# Patient Record
Sex: FEMALE | Race: BLACK OR AFRICAN AMERICAN | Employment: FULL TIME | URBAN - NONMETROPOLITAN AREA
[De-identification: names, ages, dates, MRNs, and addresses within clinical notes are randomized per-mention and may not be internally consistent; named-entity substitution may affect disease eponyms.]

---

## 2017-07-03 ENCOUNTER — OFFICE VISIT (OUTPATIENT)
Dept: ENDOCRINOLOGY | Age: 42
End: 2017-07-03

## 2017-07-03 VITALS
DIASTOLIC BLOOD PRESSURE: 80 MMHG | RESPIRATION RATE: 18 BRPM | HEIGHT: 64 IN | BODY MASS INDEX: 50.02 KG/M2 | HEART RATE: 88 BPM | WEIGHT: 293 LBS | SYSTOLIC BLOOD PRESSURE: 130 MMHG

## 2017-07-03 DIAGNOSIS — E89.0 POSTOPERATIVE HYPOTHYROIDISM: Primary | ICD-10-CM

## 2017-07-03 DIAGNOSIS — I10 ESSENTIAL HYPERTENSION: ICD-10-CM

## 2017-07-03 DIAGNOSIS — E55.9 HYPOVITAMINOSIS D: ICD-10-CM

## 2017-07-03 PROCEDURE — 99214 OFFICE O/P EST MOD 30 MIN: CPT | Performed by: INTERNAL MEDICINE

## 2017-07-03 RX ORDER — LEVOTHYROXINE SODIUM 175 MCG
TABLET ORAL
Qty: 90 TABLET | Refills: 1 | Status: SHIPPED | OUTPATIENT
Start: 2017-07-03 | End: 2018-01-05 | Stop reason: SDUPTHER

## 2017-07-05 ENCOUNTER — TELEPHONE (OUTPATIENT)
Dept: ENDOCRINOLOGY | Age: 42
End: 2017-07-05

## 2017-07-05 DIAGNOSIS — E89.0 POSTOPERATIVE HYPOTHYROIDISM: ICD-10-CM

## 2017-07-05 DIAGNOSIS — E55.9 HYPOVITAMINOSIS D: Primary | ICD-10-CM

## 2018-01-02 ENCOUNTER — NURSE TRIAGE (OUTPATIENT)
Dept: ADMINISTRATIVE | Age: 43
End: 2018-01-02

## 2018-01-05 ENCOUNTER — OFFICE VISIT (OUTPATIENT)
Dept: ENDOCRINOLOGY | Age: 43
End: 2018-01-05
Payer: COMMERCIAL

## 2018-01-05 VITALS
HEIGHT: 64 IN | DIASTOLIC BLOOD PRESSURE: 66 MMHG | HEART RATE: 99 BPM | RESPIRATION RATE: 14 BRPM | SYSTOLIC BLOOD PRESSURE: 124 MMHG | WEIGHT: 289 LBS | BODY MASS INDEX: 49.34 KG/M2

## 2018-01-05 DIAGNOSIS — E55.9 HYPOVITAMINOSIS D: ICD-10-CM

## 2018-01-05 DIAGNOSIS — I10 ESSENTIAL HYPERTENSION: ICD-10-CM

## 2018-01-05 DIAGNOSIS — E89.0 POSTOPERATIVE HYPOTHYROIDISM: Primary | ICD-10-CM

## 2018-01-05 PROCEDURE — 99213 OFFICE O/P EST LOW 20 MIN: CPT | Performed by: INTERNAL MEDICINE

## 2018-01-05 RX ORDER — LEVOTHYROXINE SODIUM 175 MCG
TABLET ORAL
Qty: 90 TABLET | Refills: 1 | Status: SHIPPED | OUTPATIENT
Start: 2018-01-05 | End: 2018-08-31 | Stop reason: SDUPTHER

## 2018-01-05 RX ORDER — LEVOTHYROXINE SODIUM 175 MCG
TABLET ORAL
Qty: 90 TABLET | Refills: 1 | Status: SHIPPED | OUTPATIENT
Start: 2018-01-05 | End: 2018-01-05 | Stop reason: SDUPTHER

## 2018-01-05 NOTE — PROGRESS NOTES
systems were reviewed and negative. Objective:   /66   Pulse 99   Resp 14   Ht 5' 4.02\" (1.626 m)   Wt 289 lb (131.1 kg)   BMI 49.58 kg/m²     General:  alert, appears stated age, cooperative and no distress   Oropharynx: normal findings: lips normal without lesions, buccal mucosa normal, gums healthy and tongue midline and normal    Eyes:  negative findings: lids and lashes normal, conjunctivae and sclerae normal, corneas clear and pupils equal, round, reactive to light and accomodation       Neck: no adenopathy, no carotid bruit, no JVD and supple, symmetrical, trachea midline   Thyroid:  Status post thyroidectomy. Lung: clear to auscultation bilaterally   Heart:  regular rate and rhythm, S1, S2 normal, no murmur, click, rub or gallop and normal apical impulse   Abdomen: soft, non-tender; bowel sounds normal; no masses,  no organomegaly   Extremities: extremities normal, atraumatic, no cyanosis or edema and Homans sign is negative, no sign of DVT   Pulses: 2+ and symmetric   Skin: warm and dry, no hyperpigmentation, vitiligo, or suspicious lesions   Neuro: normal without focal findings, mental status, speech normal, alert and oriented x3, DEMETRA, cranial nerves 2-12 intact, muscle tone and strength normal and symmetric. Assessment/Plan:     1. Postoperative hypothyroidism: Clinically doing well. We do need to monitor levels. 2. Essential hypertension: on treatment with reasonable control. To continue. 3. Hypovitaminosis D: On vitamin D replacement and monitor levels.        Orders Placed This Encounter   Procedures    TSH without Reflex     Standing Status:   Future     Standing Expiration Date:   1/5/2019    T4, Free     Standing Status:   Future     Standing Expiration Date:   1/5/2019    Vitamin D 25 Hydroxy     Standing Status:   Future     Standing Expiration Date:   1/5/2019    TSH without Reflex     Standing Status:   Future     Standing Expiration Date:   1/5/2019    T4, Free     Standing Status:   Future     Standing Expiration Date:   1/5/2019       · The risks and benefits of my recommendations, as well as other treatment options were discussed with the patient today. Questions were answered. · Follow up: 6 months and as needed. Electronically signed by Salty Dozier MD on 1/5/18 at 11:07 AM    **This report has been created using voice recognition software. It may   contain minor errors which are inherent in voice recognition technology. **

## 2018-01-08 ENCOUNTER — HOSPITAL ENCOUNTER (OUTPATIENT)
Age: 43
Discharge: HOME | End: 2018-01-08
Payer: COMMERCIAL

## 2018-01-08 DIAGNOSIS — E55.9 HYPOVITAMINOSIS D: ICD-10-CM

## 2018-01-08 DIAGNOSIS — E89.0 POSTOPERATIVE HYPOTHYROIDISM: ICD-10-CM

## 2018-01-08 LAB
AVERAGE GLUCOSE: 102 MG/DL (ref 70–126)
CA 125: 7 U/ML
HBA1C MFR BLD: 5.4 % (ref 4.4–6.4)
HCT VFR BLD CALC: 34.2 % (ref 37–47)
HEMOGLOBIN: 11.6 GM/DL (ref 12–16)
MCH RBC QN AUTO: 31.4 PG (ref 27–31)
MCHC RBC AUTO-ENTMCNC: 33.8 GM/DL (ref 33–37)
MCV RBC AUTO: 92.9 FL (ref 81–99)
PDW BLD-RTO: 15.2 % (ref 11.5–14.5)
PLATELET # BLD: 214 THOU/MM3 (ref 130–400)
PMV BLD AUTO: 10.2 MCM (ref 7.4–10.4)
RBC # BLD: 3.68 MILL/MM3 (ref 4.2–5.4)
T4 FREE: 1.5 NG/DL (ref 0.93–1.76)
TSH SERPL DL<=0.05 MIU/L-ACNC: 2.24 UIU/ML (ref 0.4–4.2)
VITAMIN D 25-HYDROXY: 35 NG/ML (ref 30–100)
WBC # BLD: 4.8 THOU/MM3 (ref 4.8–10.8)

## 2018-01-08 PROCEDURE — 36415 COLL VENOUS BLD VENIPUNCTURE: CPT

## 2018-01-08 PROCEDURE — 84439 ASSAY OF FREE THYROXINE: CPT

## 2018-01-08 PROCEDURE — 84443 ASSAY THYROID STIM HORMONE: CPT

## 2018-01-08 PROCEDURE — 82306 VITAMIN D 25 HYDROXY: CPT

## 2018-01-08 PROCEDURE — 83036 HEMOGLOBIN GLYCOSYLATED A1C: CPT

## 2018-01-08 PROCEDURE — 85027 COMPLETE CBC AUTOMATED: CPT

## 2018-01-08 PROCEDURE — 86304 IMMUNOASSAY TUMOR CA 125: CPT

## 2018-07-03 ENCOUNTER — NURSE TRIAGE (OUTPATIENT)
Dept: ADMINISTRATIVE | Age: 43
End: 2018-07-03

## 2018-08-02 ENCOUNTER — HOSPITAL ENCOUNTER (OUTPATIENT)
Dept: MRI IMAGING | Age: 43
Discharge: HOME | End: 2018-08-02
Payer: COMMERCIAL

## 2018-08-02 DIAGNOSIS — M25.561 ACUTE PAIN OF RIGHT KNEE: ICD-10-CM

## 2018-08-02 DIAGNOSIS — M25.361 INSTABILITY OF RIGHT KNEE JOINT: ICD-10-CM

## 2018-08-02 PROCEDURE — 73721 MRI JNT OF LWR EXTRE W/O DYE: CPT

## 2018-08-16 ENCOUNTER — APPOINTMENT (OUTPATIENT)
Dept: PHYSICAL THERAPY | Age: 43
End: 2018-08-16
Payer: COMMERCIAL

## 2018-08-22 ENCOUNTER — HOSPITAL ENCOUNTER (OUTPATIENT)
Dept: PHYSICAL THERAPY | Age: 43
Setting detail: THERAPIES SERIES
Discharge: HOME | End: 2018-08-22
Payer: COMMERCIAL

## 2018-08-23 ENCOUNTER — HOSPITAL ENCOUNTER (OUTPATIENT)
Dept: PHYSICAL THERAPY | Age: 43
Setting detail: THERAPIES SERIES
Discharge: HOME | End: 2018-08-23
Payer: COMMERCIAL

## 2018-08-23 PROCEDURE — 97162 PT EVAL MOD COMPLEX 30 MIN: CPT

## 2018-08-23 PROCEDURE — 97110 THERAPEUTIC EXERCISES: CPT

## 2018-08-23 ASSESSMENT — PAIN DESCRIPTION - LOCATION: LOCATION: LEG;KNEE;HIP

## 2018-08-23 ASSESSMENT — PAIN SCALES - GENERAL: PAINLEVEL_OUTOF10: 7

## 2018-08-23 ASSESSMENT — PAIN DESCRIPTION - PAIN TYPE: TYPE: CHRONIC PAIN

## 2018-08-23 ASSESSMENT — PAIN DESCRIPTION - ORIENTATION: ORIENTATION: RIGHT
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
## 2018-08-28 ENCOUNTER — HOSPITAL ENCOUNTER (OUTPATIENT)
Dept: PHYSICAL THERAPY | Age: 43
Setting detail: THERAPIES SERIES
Discharge: HOME | End: 2018-08-28
Payer: COMMERCIAL

## 2018-08-28 PROCEDURE — 97110 THERAPEUTIC EXERCISES: CPT

## 2018-08-28 PROCEDURE — G0283 ELEC STIM OTHER THAN WOUND: HCPCS

## 2018-08-28 ASSESSMENT — PAIN SCALES - GENERAL: PAINLEVEL_OUTOF10: 8

## 2018-08-28 ASSESSMENT — PAIN DESCRIPTION - PAIN TYPE: TYPE: CHRONIC PAIN

## 2018-08-28 ASSESSMENT — PAIN DESCRIPTION - ORIENTATION: ORIENTATION: RIGHT

## 2018-08-28 ASSESSMENT — PAIN DESCRIPTION - LOCATION: LOCATION: LEG;KNEE;HIP

## 2018-08-28 NOTE — PROGRESS NOTES
flexion for ease with dressing  Short term goal 2: Patient to ambulate with more normal gait pattern with smooth swing through, good heel strike and no compensation to do all shopping  Short term goal 3: Patient to be able to use reciprocal pattern and minimal compensation to go up and down stairs to get into the home  Short term goal 4: Patient to perform all balance activity with one hand assist for safety with community ambulation  Short term goal 5: Patient to start pool therapy without increased pain to progress to land therapy. Long term goals  Time Frame for Long term goals : 8 weeks  Long term goal 1: Patient to be independent with home program to perform all daily activity with minimal to no right knee pain.          [address], [address] [place] [address]

## 2018-08-31 ENCOUNTER — HOSPITAL ENCOUNTER (OUTPATIENT)
Dept: PHYSICAL THERAPY | Age: 43
Setting detail: THERAPIES SERIES
Discharge: HOME | End: 2018-08-31
Payer: COMMERCIAL

## 2018-08-31 DIAGNOSIS — E89.0 POSTOPERATIVE HYPOTHYROIDISM: ICD-10-CM

## 2018-08-31 PROCEDURE — 97113 AQUATIC THERAPY/EXERCISES: CPT

## 2018-08-31 ASSESSMENT — PAIN SCALES - GENERAL: PAINLEVEL_OUTOF10: 5

## 2018-08-31 NOTE — PROGRESS NOTES
minutes with noodle  Hip Flex/Ext: x10 with noodle  Hip ABD/ADD: x10 with noodle         Activity Tolerance:  Activity Tolerance: Patient Tolerated treatment well, Patient limited by pain    Assessment: Body structures, Functions, Activity limitations: Decreased functional mobility , Decreased ADL status, Decreased ROM, Decreased strength, Decreased endurance, Decreased balance  Assessment: Patient tolerated pool session fairly well. No complaints of increased pain throughout session. Patient did have some mild discomfort and instability in the knee with step ups. No change in pain at end of session. Prognosis: Fair  Discharge Recommendations: Continue to assess pending progress    Patient Education:  Patient Education: Continue HEP. Stop with any production of pain. Monitor pain after pool session    Plan:  Times per week: 2-3x/week depending on patient's schedule and what doctor orders  Plan weeks: 8 weeks  Specific instructions for Next Treatment: pool therapy, gait and balance training, knee ROM and stretches at steps, Nu-step, modalities or manual therapy, taping. Current Treatment Recommendations: Strengthening, ROM, Balance Training, Functional Mobility Training, Endurance Training, Stair training, Gait Training, Neuromuscular Re-education, Manual Therapy - Soft Tissue Mobilization, Home Exercise Program, Modalities, Aquatics  Plan Comment: Continue with POC    Goals:  Patient goals :  To return to all activity without right knee and thigh pain    Short term goals  Time Frame for Short term goals: 4 weeks  Short term goal 1: Patient to demonstrate full right knee extension and 120 degrees flexion for ease with dressing  Short term goal 2: Patient to ambulate with more normal gait pattern with smooth swing through, good heel strike and no compensation to do all shopping  Short term goal 3: Patient to be able to use reciprocal pattern and minimal compensation to go up and down stairs to get into the

## 2018-09-04 ENCOUNTER — HOSPITAL ENCOUNTER (OUTPATIENT)
Dept: PHYSICAL THERAPY | Age: 43
Setting detail: THERAPIES SERIES
Discharge: HOME | End: 2018-09-04
Payer: COMMERCIAL

## 2018-09-04 ENCOUNTER — TELEPHONE (OUTPATIENT)
Dept: INTERNAL MEDICINE CLINIC | Age: 43
End: 2018-09-04

## 2018-09-04 PROCEDURE — 97113 AQUATIC THERAPY/EXERCISES: CPT

## 2018-09-04 RX ORDER — LEVOTHYROXINE SODIUM 175 MCG
TABLET ORAL
Qty: 90 TABLET | Refills: 1 | Status: SHIPPED | OUTPATIENT
Start: 2018-09-04 | End: 2019-05-10 | Stop reason: SDUPTHER

## 2018-09-04 ASSESSMENT — PAIN SCALES - GENERAL: PAINLEVEL_OUTOF10: 6

## 2018-09-04 ASSESSMENT — PAIN DESCRIPTION - PAIN TYPE: TYPE: CHRONIC PAIN

## 2018-09-04 NOTE — PROGRESS NOTES
[place]     Time In: 7038  Time Out: 3238  Minutes: 50  Timed Code Treatment Minutes: 50 Minutes     Date: 2018  Patient Name: Yvonne Campbell,  Gender:  female        CSN: [id number]   : 1975  (43 y.o.)       Referring Practitioner: Dr Robe Krueger      Diagnosis: Primary OA of right knee, Quadriceps weakness, Right thigh pain  Treatment Diagnosis: right knee and leg pain, right leg weakness, balance deficits, difficulty with ambuation   Additional Pertinent Hx: No restrictions from doctor. Hx: High BP, Irregular heartbeat                  General:  PT Visit Information  Onset Date: 18  PT Insurance Information: [facility] Medicaid - Allowed 30 visits PT per calendar year. Aquatics and modalities except HP/CP covered. Total # of Visits to Date: 4  Plan of Care/Certification Expiration Date: 10/18/18  Progress Note Counter: 4/10               Subjective:  Chart Reviewed: Yes  Patient assessed for rehabilitation services?: Yes  Family / Caregiver Present: No  Comments: Follow up with doctor in 4 weeks. States doctor said to hold on bracing until next doctor appt. Subjective: Knee felt really good following last appointment; felt like my knee was responding as it should. Patient ended up walking most of the way here.      Pain:  Patient Currently in Pain: Yes  Pain Assessment: 0-10  Pain Level: 6  Pain Type: Chronic pain      Objective                  LE Warm Up: Ambulation (F,L,R): x2 laps each direction           Static Strengthening LEs  Heel/Toe Raises: x10 each  Marching: with noodle-emphasis on biking motion  Hamstring Curls: x10 bilateral with noodle  4-Way Hip: (3-way) x15 bilateral   Dynamic Strengthening LE  Step-Ups (F,L,R): Forward x10 bilateral-left foot on step  Dynamic Strengthening Other: partial squat position (chair pose) with UE horizontal abduction x10; squat with forward punches with more normal gait pattern with smooth swing through, good heel strike and no compensation to do all shopping  Short term goal 3: Patient to be able to use reciprocal pattern and minimal compensation to go up and down stairs to get into the home  Short term goal 4: Patient to perform all balance activity with one hand assist for safety with community ambulation  Short term goal 5: Patient to start pool therapy without increased pain to progress to land therapy. Long term goals  Time Frame for Long term goals : 8 weeks  Long term goal 1: Patient to be independent with home program to perform all daily activity with minimal to no right knee pain.          Doug Schmidt, PT

## 2018-09-05 ENCOUNTER — HOSPITAL ENCOUNTER (OUTPATIENT)
Dept: PHYSICAL THERAPY | Age: 43
Setting detail: THERAPIES SERIES
Discharge: HOME | End: 2018-09-05
Payer: COMMERCIAL

## 2018-09-05 PROCEDURE — 97113 AQUATIC THERAPY/EXERCISES: CPT

## 2018-09-05 ASSESSMENT — PAIN DESCRIPTION - LOCATION: LOCATION: KNEE

## 2018-09-05 ASSESSMENT — PAIN SCALES - GENERAL: PAINLEVEL_OUTOF10: 4

## 2018-09-05 ASSESSMENT — PAIN DESCRIPTION - ORIENTATION: ORIENTATION: RIGHT

## 2018-09-05 NOTE — PROGRESS NOTES
[place]     Time In: 1100  Time Out: 8243  Minutes: 45  Timed Code Treatment Minutes: 45 Minutes     Date: 2018  Patient Name: David Swenson,  Gender:  female        CSN: [id number]   : 1975  (43 y.o.)       Referring Practitioner: Dr Kirt Hickman      Diagnosis: Primary OA of right knee, Quadriceps weakness, Right thigh pain  Treatment Diagnosis: right knee and leg pain, right leg weakness, balance deficits, difficulty with ambuation   Additional Pertinent Hx: No restrictions from doctor. Hx: High BP, Irregular heartbeat                  General:  PT Visit Information  Onset Date: 18  PT Insurance Information: [employer] - Allowed 30 visits PT per calendar year. Aquatics and modalities except HP/CP covered. Total # of Visits Approved: 30  Total # of Visits to Date: 5  Plan of Care/Certification Expiration Date: 10/18/18  Progress Note Counter: 5/10               Subjective:  Chart Reviewed: Yes  Patient assessed for rehabilitation services?: Yes  Family / Caregiver Present: No  Comments: Follow up with doctor in 4 weeks. States doctor said to hold on bracing until next doctor appt. Subjective: Pt stated R knee is stiff today. Pt has no had any falls or R knee has not given out. Pain control with ibuprofen and aleve.       Pain:  Patient Currently in Pain: Yes  Pain Level: 4  Pain Location: Knee  Pain Orientation: Right      Objective                                                                                    LE Warm Up: Ambulation (F,L,R): x2 laps each direction  LE Streches: Right hamstring and calf 3x 15 seconds at step           Static Strengthening LEs  Heel/Toe Raises: x15 each  Squats: x15  Marching: X15 ea  Hamstring Curls: x15 ea  4-Way Hip: (3-way) x15 bilateral   Dynamic Strengthening LE  Step-Ups (F,L,R): Forward and lateral x10 bilateral lead  Dynamic Strengthening Other: partial squat position (chair pose) with UE horizontal abduction x10; squat with forward punches with yellow resistance paddles (level 3)- 10 punches   Deep H2O LE  Bike: x2 minutes with noodle (began session with bike)  Hang: for 5 min. Hip Flex/Ext: x10 with noodle  Hip ABD/ADD: x10 with noodle                             Activity Tolerance:  Activity Tolerance: Patient Tolerated treatment well    Assessment: Body structures, Functions, Activity limitations: Decreased functional mobility , Decreased ADL status, Decreased ROM, Decreased strength, Decreased endurance, Decreased balance  Assessment: Added reps this date with good tolerance. Educated pt in abdominal bracing with activity to assist with posture. Continue to strengthen R knee to return to functional mobility. Prognosis: Fair  Discharge Recommendations: Continue to assess pending progress    Patient Education:  Patient Education: abdominal bracing, posture, stretches for R knee                      Plan:  Times per week: 2-3x/week depending on patient's schedule and what doctor orders  Plan weeks: 8 weeks  Specific instructions for Next Treatment: pool therapy, gait and balance training, knee ROM and stretches at steps, Nu-step, modalities or manual therapy, taping. Current Treatment Recommendations: Strengthening, ROM, Balance Training, Functional Mobility Training, Endurance Training, Stair training, Gait Training, Neuromuscular Re-education, Manual Therapy - Soft Tissue Mobilization, Home Exercise Program, Modalities, Aquatics  Plan Comment: Continue with POC; start with the deep water cycling before other exercises     Goals:  Patient goals :  To return to all activity without right knee and thigh pain    Short term goals  Time Frame for Short term goals: 4 weeks  Short term goal 1: Patient to demonstrate full right knee extension and 120 degrees flexion for ease with dressing  Short term goal 2: Patient to ambulate with more normal gait

## 2018-09-07 ENCOUNTER — NURSE TRIAGE (OUTPATIENT)
Dept: ADMINISTRATIVE | Age: 43
End: 2018-09-07

## 2018-09-07 ENCOUNTER — APPOINTMENT (OUTPATIENT)
Dept: PHYSICAL THERAPY | Age: 43
End: 2018-09-07
Payer: COMMERCIAL

## 2018-09-07 NOTE — TELEPHONE ENCOUNTER
prescription or OTC? \" (e.g., birth control pills, estrogen)      none  9. BLOOD THINNERS: \"Do you take any blood thinners? \" (e.g., Coumadin/warfarin, Pradaxa/dabigatran, aspirin)      no  10. CAUSE: \"What do you think is causing the bleeding? \" (e.g., recent gyn surgery, recent gyn procedure; known bleeding disorder, cervical cancer, polycystic ovarian disease, fibroids)          Not sure  11. HEMODYNAMIC STATUS: \"Are you weak or feeling lightheaded? \" If so, ask: \"Can you stand and walk normally? \"   None, feeling fine, saw Dr Ryan Rosado in Jan or Feb 2018 PAP normal          12. OTHER SYMPTOMS: \"What other symptoms are you having with the bleeding? \" (e.g., passed tissue, vaginal discharge, fever, menstrual-type cramps)        none    Protocols used: VAGINAL BLEEDING - ABNORMAL-ADULT-

## 2018-09-10 ENCOUNTER — APPOINTMENT (OUTPATIENT)
Dept: PHYSICAL THERAPY | Age: 43
End: 2018-09-10
Payer: COMMERCIAL

## 2018-09-11 ENCOUNTER — HOSPITAL ENCOUNTER (OUTPATIENT)
Dept: PHYSICAL THERAPY | Age: 43
Setting detail: THERAPIES SERIES
Discharge: HOME | End: 2018-09-11
Payer: COMMERCIAL

## 2018-09-11 PROCEDURE — 97113 AQUATIC THERAPY/EXERCISES: CPT

## 2018-09-11 ASSESSMENT — PAIN SCALES - GENERAL: PAINLEVEL_OUTOF10: 4

## 2018-09-11 ASSESSMENT — PAIN DESCRIPTION - PAIN TYPE: TYPE: CHRONIC PAIN

## 2018-09-11 ASSESSMENT — PAIN DESCRIPTION - LOCATION: LOCATION: KNEE

## 2018-09-11 NOTE — PROGRESS NOTES
[place]     Time In: 1400  Time Out: 7353  Minutes: 45  Timed Code Treatment Minutes: 45 Minutes     Date: 2018  Patient Name: Judith Martin,  Gender:  female        CSN: [id number]   : 1975  (43 y.o.)       Referring Practitioner: Dr Simmons Police      Diagnosis: Primary OA of right knee, Quadriceps weakness, Right thigh pain  Treatment Diagnosis: right knee and leg pain, right leg weakness, balance deficits, difficulty with ambuation   Additional Pertinent Hx: No restrictions from doctor. Hx: High BP, Irregular heartbeat                  General:  PT Visit Information  Onset Date: 18  PT Insurance Information: [employer] - Allowed 30 visits PT per calendar year. Aquatics and modalities except HP/CP covered. Total # of Visits Approved: 30  Total # of Visits to Date: 6  Plan of Care/Certification Expiration Date: 10/18/18  Progress Note Counter: 6/10               Subjective:  Chart Reviewed: Yes  Patient assessed for rehabilitation services?: Yes  Response To Previous Treatment: Patient with no complaints from previous session. Family / Caregiver Present: No     Subjective: Patient reports 4/10 today. Had increase in pain on  during her mom's wedding.      Pain:  Patient Currently in Pain: Yes  Pain Assessment: 0-10  Pain Level: 4  Pain Type: Chronic pain  Pain Location: Knee      Objective                   LE Warm Up: Ambulation (F,L,R): x2 laps each direction  LE Streches: Right hamstring and calf 3x 15 seconds at step           Static Strengthening LEs  Heel/Toe Raises: x15 each  Squats: x15  Marching: scissors/cross country skiiers 30x  Hamstring Curls: x15 ea  4-Way Hip: (3-way) x15 bilateral   Dynamic Strengthening LE  Step-Ups (F,L,R): Forward and lateral x10 bilateral lead  Deep H2O LE  Bike: x2 minutes 2 separate times-at the beginning and again at the end  Jan: for 5

## 2018-09-12 ENCOUNTER — HOSPITAL ENCOUNTER (OUTPATIENT)
Dept: PHYSICAL THERAPY | Age: 43
Setting detail: THERAPIES SERIES
Discharge: HOME | End: 2018-09-12
Payer: COMMERCIAL

## 2018-09-12 PROCEDURE — 97113 AQUATIC THERAPY/EXERCISES: CPT

## 2018-09-12 ASSESSMENT — PAIN DESCRIPTION - PAIN TYPE: TYPE: CHRONIC PAIN

## 2018-09-12 ASSESSMENT — PAIN DESCRIPTION - ORIENTATION: ORIENTATION: RIGHT

## 2018-09-12 ASSESSMENT — PAIN DESCRIPTION - LOCATION: LOCATION: KNEE

## 2018-09-12 ASSESSMENT — PAIN SCALES - GENERAL: PAINLEVEL_OUTOF10: 4

## 2018-09-14 ENCOUNTER — HOSPITAL ENCOUNTER (OUTPATIENT)
Dept: PHYSICAL THERAPY | Age: 43
Setting detail: THERAPIES SERIES
Discharge: HOME | End: 2018-09-14
Payer: COMMERCIAL

## 2018-09-14 PROCEDURE — 97110 THERAPEUTIC EXERCISES: CPT

## 2018-09-14 ASSESSMENT — PAIN DESCRIPTION - ORIENTATION: ORIENTATION: RIGHT

## 2018-09-14 ASSESSMENT — PAIN SCALES - GENERAL: PAINLEVEL_OUTOF10: 5

## 2018-09-14 ASSESSMENT — PAIN DESCRIPTION - PAIN TYPE: TYPE: CHRONIC PAIN

## 2018-09-14 ASSESSMENT — PAIN DESCRIPTION - LOCATION: LOCATION: KNEE

## 2018-09-14 NOTE — PROGRESS NOTES
limited knee mobility and occasional circumduction but is starting to move a little more smoothly as knee gets stronger  Short term goal 3: Patient to be able to use reciprocal pattern and minimal compensation to go up and down stairs to get into the home - NOT MET Patient still using non-reciprocal pattern for the stairs. Short term goal 4: Patient to perform all balance activity with one hand assist for safety with community ambulation - NOT MET Patient just getting ready to start balance activity on land. Short term goal 5: Patient to start pool therapy without increased pain to progress to land therapy. - MET    Long term goals  Time Frame for Long term goals : 8 weeks  Long term goal 1: Patient to be independent with home program to perform all daily activity with minimal to no right knee pain. - NOT MET Patient still working towards independence. Revised Short-Term Goals:    Short term goals  Time Frame for Short term goals: 4 weeks  Short term goal 1: See LTG  Short term goal 2:    Short term goal 3:    Short term goal 4:    Short term goal 5:      Revised Long-Term Goals  Long term goals  Time Frame for Long term goals : 4 weeks  Long term goal 1: Patient to be independent with home program to perform all daily activity with minimal to no right knee pain.   Long term goal 2: Patient to demonstrate full right knee extension and 120 degrees flexion for ease with dressing  Long term goal 3: Patient to ambulate with more normal gait pattern with smooth swing through, good heel strike and no compensation to do all shopping  Long term goal 4: Patient to be able to use reciprocal pattern and minimal compensation to go up and down stairs to get into the home  Long term goal 5: Patient to perform all balance activity with one hand assist for safety with community ambulation         [address], [address] [place] [address]

## 2018-09-18 ENCOUNTER — HOSPITAL ENCOUNTER (OUTPATIENT)
Dept: PHYSICAL THERAPY | Age: 43
Setting detail: THERAPIES SERIES
Discharge: HOME | End: 2018-09-18
Payer: COMMERCIAL

## 2018-09-18 PROCEDURE — 97113 AQUATIC THERAPY/EXERCISES: CPT

## 2018-09-18 ASSESSMENT — PAIN SCALES - GENERAL: PAINLEVEL_OUTOF10: 3

## 2018-09-18 NOTE — PROGRESS NOTES
closed yellow paddles x15 punches   Deep H2O LE  Bike: x4 minutes - at the end of session  Hang: x5 minutes with noodle  Hip Flex/Ext: x1 minute with noodle  Hip ABD/ADD: x1 minute  with noodle         Activity Tolerance:  Activity Tolerance: Patient Tolerated treatment well  Activity Tolerance: Pain 3/10 at end of session. Assessment: Body structures, Functions, Activity limitations: Decreased functional mobility , Decreased ADL status, Decreased ROM, Decreased strength, Decreased endurance, Decreased balance  Assessment: Right thigh/knee pain and back pain increased to 4/10 while in the pool. At end of session patient rated right leg pain 3/10 and back pain had abolished. Prognosis: Fair  Discharge Recommendations: Continue to assess pending progress    Patient Education:  Patient Education: Progression of therapy. Starting to move as normally as can. Plan:  Times per week: 2x/week  Plan weeks: 4 weeks  Specific instructions for Next Treatment: pool therapy, gait and balance training, knee ROM and stretches at steps, Nu-step, modalities or manual therapy, taping. Current Treatment Recommendations: Strengthening, ROM, Balance Training, Functional Mobility Training, Endurance Training, Stair training, Gait Training, Neuromuscular Re-education, Manual Therapy - Soft Tissue Mobilization, Home Exercise Program, Modalities, Aquatics  Plan Comment: Continue with POC    Goals:  Patient goals : To return to all activity without right knee and thigh pain    Short term goals  Time Frame for Short term goals: 4 weeks  Short term goal 1: See LTG    Long term goals  Time Frame for Long term goals : 4 weeks  Long term goal 1: Patient to be independent with home program to perform all daily activity with minimal to no right knee pain.   Long term goal 2: Patient to demonstrate full right knee extension and 120 degrees flexion for ease with dressing  Long term goal 3: Patient to ambulate with more normal gait pattern

## 2018-09-19 ENCOUNTER — APPOINTMENT (OUTPATIENT)
Dept: PHYSICAL THERAPY | Age: 43
End: 2018-09-19
Payer: COMMERCIAL

## 2018-09-21 ENCOUNTER — HOSPITAL ENCOUNTER (OUTPATIENT)
Dept: PHYSICAL THERAPY | Age: 43
Setting detail: THERAPIES SERIES
Discharge: HOME | End: 2018-09-21
Payer: COMMERCIAL

## 2018-09-21 PROCEDURE — 97110 THERAPEUTIC EXERCISES: CPT

## 2018-09-21 ASSESSMENT — PAIN DESCRIPTION - PAIN TYPE: TYPE: CHRONIC PAIN

## 2018-09-21 ASSESSMENT — PAIN DESCRIPTION - ORIENTATION: ORIENTATION: RIGHT

## 2018-09-21 ASSESSMENT — PAIN DESCRIPTION - LOCATION: LOCATION: KNEE

## 2018-09-21 ASSESSMENT — PAIN SCALES - GENERAL: PAINLEVEL_OUTOF10: 4

## 2018-09-21 NOTE — PROGRESS NOTES
[facility]     Time In: 9818  Time Out: 1449  Minutes: 30  Timed Code Treatment Minutes: 31 Minutes     Date: 2018  Patient Name: Meryl Eaton,  Gender:  female        CSN: [id number]   : 1975  (43 y.o.)       Referring Practitioner: Dr Jerad Lindquist      Diagnosis: Primary OA of right knee, Quadriceps weakness, Right thigh pain  Treatment Diagnosis: right knee and leg pain, right leg weakness, balance deficits, difficulty with ambuation   Additional Pertinent Hx: No restrictions from doctor. Hx: High BP, Irregular heartbeat       General:  PT Visit Information  PT Insurance Information: [facility] Medicaid - Allowed 30 visits PT per calendar year. Aquatics and modalities except HP/CP covered. Total # of Visits Approved: 30  Total # of Visits to Date: 10  Plan of Care/Certification Expiration Date: 10/18/18  Progress Note Counter:  for PN               Subjective:  Chart Reviewed: Yes  Patient assessed for rehabilitation services?: Yes  Family / Caregiver Present: No  Comments: Follow up with doctor in next 1-2 weeks. States doctor said to hold on bracing until next doctor appt. Subjective: Patient reports the knee is a little stiff today. Pain:  Patient Currently in Pain: Yes  Pain Assessment: 0-10  Pain Level: 4 (3-4/10)  Pain Type: Chronic pain  Pain Location: Knee  Pain Orientation: Right      Objective  Exercises  Exercise 1: Standing exercises: 10x heel/toe raises, HS curls, marches, 3 way hip, mini squats x 5- started to increase pain-stopped   Exercise 2: Supine: quad sets with towel under knee 10 x 5 sec, heel slides, abduction and leg raises 10x each   Exercise 3: Seated: marches x 10, adduction ball squeeze 10 x 5 sec  Exercise 4: Nu-step Level 2 for 5 minutes  Exercise 5: Stretches at steps: knee flexion and hamstring for right, calf stretch 3x15 seconds.          Activity

## 2018-09-25 ENCOUNTER — OFFICE VISIT (OUTPATIENT)
Dept: FAMILY MEDICINE CLINIC | Age: 43
End: 2018-09-25
Payer: COMMERCIAL

## 2018-09-25 ENCOUNTER — HOSPITAL ENCOUNTER (OUTPATIENT)
Dept: PHYSICAL THERAPY | Age: 43
Setting detail: THERAPIES SERIES
End: 2018-09-25
Payer: COMMERCIAL

## 2018-09-25 VITALS
HEIGHT: 62 IN | WEIGHT: 288.8 LBS | OXYGEN SATURATION: 99 % | DIASTOLIC BLOOD PRESSURE: 82 MMHG | SYSTOLIC BLOOD PRESSURE: 134 MMHG | BODY MASS INDEX: 53.15 KG/M2 | TEMPERATURE: 98.6 F | HEART RATE: 74 BPM

## 2018-09-25 DIAGNOSIS — E55.9 HYPOVITAMINOSIS D: ICD-10-CM

## 2018-09-25 DIAGNOSIS — F41.9 ANXIETY: ICD-10-CM

## 2018-09-25 DIAGNOSIS — M79.89 LEG SWELLING: ICD-10-CM

## 2018-09-25 DIAGNOSIS — Z12.31 SCREENING MAMMOGRAM, ENCOUNTER FOR: ICD-10-CM

## 2018-09-25 DIAGNOSIS — E89.0 POSTOPERATIVE HYPOTHYROIDISM: ICD-10-CM

## 2018-09-25 DIAGNOSIS — I10 ESSENTIAL HYPERTENSION: ICD-10-CM

## 2018-09-25 DIAGNOSIS — R01.1 HEART MURMUR: Primary | ICD-10-CM

## 2018-09-25 PROCEDURE — G8417 CALC BMI ABV UP PARAM F/U: HCPCS | Performed by: FAMILY MEDICINE

## 2018-09-25 PROCEDURE — 1036F TOBACCO NON-USER: CPT | Performed by: FAMILY MEDICINE

## 2018-09-25 PROCEDURE — G8427 DOCREV CUR MEDS BY ELIG CLIN: HCPCS | Performed by: FAMILY MEDICINE

## 2018-09-25 PROCEDURE — 99203 OFFICE O/P NEW LOW 30 MIN: CPT | Performed by: FAMILY MEDICINE

## 2018-09-25 RX ORDER — METOLAZONE 5 MG/1
5 TABLET ORAL DAILY
Qty: 90 TABLET | Refills: 1 | Status: SHIPPED | OUTPATIENT
Start: 2018-09-25 | End: 2019-04-08 | Stop reason: SDUPTHER

## 2018-09-25 RX ORDER — LOSARTAN POTASSIUM 50 MG/1
50 TABLET ORAL DAILY
Qty: 90 TABLET | Refills: 1 | Status: CANCELLED | OUTPATIENT
Start: 2018-09-25

## 2018-09-25 RX ORDER — LOSARTAN POTASSIUM AND HYDROCHLOROTHIAZIDE 25; 100 MG/1; MG/1
1 TABLET ORAL DAILY
Qty: 30 TABLET | Refills: 3 | Status: SHIPPED | OUTPATIENT
Start: 2018-09-25 | End: 2018-11-02

## 2018-09-25 RX ORDER — HYDROCHLOROTHIAZIDE 25 MG/1
25 TABLET ORAL DAILY
Qty: 90 TABLET | Refills: 1 | Status: CANCELLED | OUTPATIENT
Start: 2018-09-25

## 2018-09-25 RX ORDER — SERTRALINE HYDROCHLORIDE 25 MG/1
50 TABLET, FILM COATED ORAL DAILY
Qty: 180 TABLET | Refills: 1 | Status: SHIPPED | OUTPATIENT
Start: 2018-09-25 | End: 2019-04-08

## 2018-09-25 ASSESSMENT — PATIENT HEALTH QUESTIONNAIRE - PHQ9
SUM OF ALL RESPONSES TO PHQ QUESTIONS 1-9: 0
2. FEELING DOWN, DEPRESSED OR HOPELESS: 0
SUM OF ALL RESPONSES TO PHQ QUESTIONS 1-9: 0
SUM OF ALL RESPONSES TO PHQ9 QUESTIONS 1 & 2: 0
1. LITTLE INTEREST OR PLEASURE IN DOING THINGS: 0

## 2018-09-25 ASSESSMENT — ENCOUNTER SYMPTOMS
BLOOD IN STOOL: 0
TROUBLE SWALLOWING: 0
NAUSEA: 0
DIARRHEA: 0
ABDOMINAL PAIN: 0
EYE PAIN: 0
CONSTIPATION: 0
SHORTNESS OF BREATH: 0
VOMITING: 0
COUGH: 0

## 2018-09-25 NOTE — PROGRESS NOTES
ECHO Complete 2D W Doppler W Color   4. Essential hypertension  Basic Metabolic Panel    Hepatic Function Panel    Lipid Panel    Magnesium    T4    losartan-hydrochlorothiazide (HYZAAR) 100-25 MG per tablet    TSH without Reflex   5. Hypovitaminosis D     6. Anxiety     7. Screening mammogram, encounter for  ALEXIS Screening Bilateral       Plan: Will combine the lorsartan and the hctz in one pill    Will refill the merolazone for a few times a week when you have the swelling    Will refill the zoloft for the anxiety    Will get the echo to check on the leg swelling and the heart murmur    Will see you back in a few months and will get the bloodwork at that time       No Follow-up on file. Orders Placed:  Orders Placed This Encounter   Procedures    ALEXIS Screening Bilateral    Basic Metabolic Panel    Hepatic Function Panel    Lipid Panel    Magnesium    T4    TSH without Reflex    ECHO Complete 2D W Doppler W Color     Medications Prescribed:  Orders Placed This Encounter   Medications    sertraline (ZOLOFT) 25 MG tablet     Sig: Take 2 tablets by mouth daily     Dispense:  180 tablet     Refill:  1    metolazone (ZAROXOLYN) 5 MG tablet     Sig: Take 1 tablet by mouth daily     Dispense:  90 tablet     Refill:  1    vitamin D (VITAMIN D HIGH POTENCY) 1000 units CAPS     Sig: TAKE 4 CAPSULES BY MOUTH DAILY     Dispense:  360 capsule     Refill:  1    losartan-hydrochlorothiazide (HYZAAR) 100-25 MG per tablet     Sig: Take 1 tablet by mouth daily     Dispense:  30 tablet     Refill:  3       Future Appointments  Date Time Provider Aris Mcneil   9/25/2018 11:15 AM Esther TALLEY PT None   9/27/2018 11:15 AM OSMEL Romero PT None   10/2/2018 9:30 AM Ernestene Cambridge FREDYZ PT None   10/4/2018 9:00 AM OSMEL Romero PT None       Patient given educational materials - see patient instructions. Discussed use, benefit, and side effects of prescribed medications.   All

## 2018-09-26 ENCOUNTER — HOSPITAL ENCOUNTER (OUTPATIENT)
Dept: PHYSICAL THERAPY | Age: 43
Setting detail: THERAPIES SERIES
Discharge: HOME | End: 2018-09-26
Payer: COMMERCIAL

## 2018-09-26 DIAGNOSIS — M79.89 LEG SWELLING: ICD-10-CM

## 2018-09-26 DIAGNOSIS — R01.1 HEART MURMUR: ICD-10-CM

## 2018-09-26 PROCEDURE — 97113 AQUATIC THERAPY/EXERCISES: CPT

## 2018-09-26 ASSESSMENT — PAIN DESCRIPTION - PAIN TYPE: TYPE: CHRONIC PAIN

## 2018-09-26 ASSESSMENT — PAIN SCALES - GENERAL: PAINLEVEL_OUTOF10: 5

## 2018-09-26 ASSESSMENT — PAIN DESCRIPTION - ORIENTATION: ORIENTATION: RIGHT

## 2018-09-26 NOTE — PROGRESS NOTES
[place]     Time In: 1418  Time Out: 3156  Minutes: 32  Timed Code Treatment Minutes: 32 Minutes     Date: 2018  Patient Name: Dank Combs,  Gender:  female        CSN: [id number]   : 1975  (43 y.o.)       Referring Practitioner: Dr Costa Deajersey      Diagnosis: Primary OA of right knee, Quadriceps weakness, Right thigh pain  Treatment Diagnosis: right knee and leg pain, right leg weakness, balance deficits, difficulty with ambuation   Additional Pertinent Hx: No restrictions from doctor. Hx: High BP, Irregular heartbeat                  General:  PT Visit Information  Onset Date: 18  PT Insurance Information: [employer] - Allowed 30 visits PT per calendar year. Aquatics and modalities except HP/CP covered. Total # of Visits Approved: 30  Total # of Visits to Date: 6  Plan of Care/Certification Expiration Date: 10/18/18  Progress Note Counter: 3/8 for PN               Subjective:  Chart Reviewed: Yes  Patient assessed for rehabilitation services?: Yes  Response To Previous Treatment:  (Patient reported of increased swelling and pain with last PT session with increased swelling also)  Family / Caregiver Present: No     Subjective: Patient reports that she increased swelling and pain with last PT sessions. Stated she was placed on sit down bike with arms (Nustep) at beginning of session when her knee was stiff and it flared her knee up  She went to doctor yesterday and he gave her a cream to rum on her knee and it helped a little bit. She has been elevating her Right leg but has not used ice the last 2 days.       Pain:  Patient Currently in Pain: Yes  Pain Assessment: 0-10  Pain Level: 5  Pain Type: Chronic pain  Pain Orientation: Right      Objective        LE Warm Up: Ambulation (F,L,R): x2 laps each direction  LE Streches: Right hamstring and calf 3x 15 seconds at step hold on due to Training, Neuromuscular Re-education, Manual Therapy - Soft Tissue Mobilization, Home Exercise Program, Modalities, Aquatics  Plan Comment: Continue with POC    Goals:  Patient goals : To return to all activity without right knee and thigh pain    Short term goals  Time Frame for Short term goals: 4 weeks  Short term goal 1: See LTG    Long term goals  Time Frame for Long term goals : 4 weeks  Long term goal 1: Patient to be independent with home program to perform all daily activity with minimal to no right knee pain.   Long term goal 2: Patient to demonstrate full right knee extension and 120 degrees flexion for ease with dressing  Long term goal 3: Patient to ambulate with more normal gait pattern with smooth swing through, good heel strike and no compensation to do all shopping  Long term goal 4: Patient to be able to use reciprocal pattern and minimal compensation to go up and down stairs to get into the home  Long term goal 5: Patient to perform all balance activity with one hand assist for safety with community ambulation         Jalil Frazier, [facility]

## 2018-09-27 ENCOUNTER — HOSPITAL ENCOUNTER (OUTPATIENT)
Dept: PHYSICAL THERAPY | Age: 43
Setting detail: THERAPIES SERIES
End: 2018-09-27
Payer: COMMERCIAL

## 2018-09-27 NOTE — PROGRESS NOTES
Physical Therapy  Communication Note: PT called patient and spoke with her over the phone regarding her cancellation today. Patient reported had increased knee pain after last land session and then yesterday's session did not help it. States is still having increased [pain in knee and feels like has back tracked. Patient stated had to take medication last night and started using the cream the doctor gave her. PT instructed patient to back off all exercises and just do heel slides and ankle pumps to keep ROM. PT educated to walk as much as can but if limping then needs to sit and rest. PT educated to ice, rest, use cream and medication as prescribed by doctor and just take it easy over the weekend. Educated to come for next week's appt on Tuesday so can see how is doing and call doctor if [place] [id number]: 9/27/2018

## 2018-10-02 ENCOUNTER — HOSPITAL ENCOUNTER (OUTPATIENT)
Dept: PHYSICAL THERAPY | Age: 43
Setting detail: THERAPIES SERIES
Discharge: HOME | End: 2018-10-02
Payer: COMMERCIAL

## 2018-10-02 PROCEDURE — 97113 AQUATIC THERAPY/EXERCISES: CPT

## 2018-10-02 ASSESSMENT — PAIN SCALES - GENERAL: PAINLEVEL_OUTOF10: 3

## 2018-10-02 ASSESSMENT — PAIN DESCRIPTION - ORIENTATION: ORIENTATION: RIGHT

## 2018-10-02 ASSESSMENT — PAIN DESCRIPTION - PAIN TYPE: TYPE: CHRONIC PAIN

## 2018-10-02 ASSESSMENT — PAIN DESCRIPTION - LOCATION: LOCATION: KNEE

## 2018-10-02 NOTE — PROGRESS NOTES
no compensation to do all shopping  Long term goal 4: Patient to be able to use reciprocal pattern and minimal compensation to go up and down stairs to get into the home  Long term goal 5: Patient to perform all balance activity with one hand assist for safety with community ambulation    Ryan Naranjo  PTA [id number]

## 2018-10-04 ENCOUNTER — HOSPITAL ENCOUNTER (OUTPATIENT)
Dept: PHYSICAL THERAPY | Age: 43
Setting detail: THERAPIES SERIES
Discharge: HOME | End: 2018-10-04
Payer: COMMERCIAL

## 2018-10-04 PROCEDURE — 97110 THERAPEUTIC EXERCISES: CPT

## 2018-10-04 ASSESSMENT — PAIN DESCRIPTION - LOCATION: LOCATION: KNEE

## 2018-10-04 ASSESSMENT — PAIN DESCRIPTION - ORIENTATION: ORIENTATION: RIGHT

## 2018-10-04 ASSESSMENT — PAIN SCALES - GENERAL: PAINLEVEL_OUTOF10: 3

## 2018-10-04 ASSESSMENT — PAIN DESCRIPTION - PAIN TYPE: TYPE: CHRONIC PAIN

## 2018-10-11 ENCOUNTER — HOSPITAL ENCOUNTER (OUTPATIENT)
Dept: PHYSICAL THERAPY | Age: 43
Setting detail: THERAPIES SERIES
Discharge: HOME | End: 2018-10-11
Payer: COMMERCIAL

## 2018-10-12 ENCOUNTER — TELEPHONE (OUTPATIENT)
Dept: ADMINISTRATIVE | Age: 43
End: 2018-10-12

## 2018-10-18 ENCOUNTER — HOSPITAL ENCOUNTER (OUTPATIENT)
Dept: PHYSICAL THERAPY | Age: 43
Setting detail: THERAPIES SERIES
Discharge: HOME | End: 2018-10-18
Payer: COMMERCIAL

## 2018-10-25 ENCOUNTER — HOSPITAL ENCOUNTER (OUTPATIENT)
Dept: PHYSICAL THERAPY | Age: 43
Setting detail: THERAPIES SERIES
Discharge: HOME | End: 2018-10-25
Payer: COMMERCIAL

## 2018-11-02 ENCOUNTER — TELEPHONE (OUTPATIENT)
Dept: FAMILY MEDICINE CLINIC | Age: 43
End: 2018-11-02

## 2018-11-02 RX ORDER — LOSARTAN POTASSIUM 100 MG/1
100 TABLET ORAL DAILY
Qty: 30 TABLET | Refills: 3 | Status: SHIPPED | OUTPATIENT
Start: 2018-11-02 | End: 2019-04-08 | Stop reason: SDUPTHER

## 2018-11-02 RX ORDER — HYDROCHLOROTHIAZIDE 25 MG/1
25 TABLET ORAL DAILY
Qty: 30 TABLET | Refills: 3 | Status: SHIPPED | OUTPATIENT
Start: 2018-11-02 | End: 2019-04-08 | Stop reason: SDUPTHER

## 2019-03-11 ENCOUNTER — HOSPITAL ENCOUNTER (OUTPATIENT)
Age: 44
Discharge: HOME | End: 2019-03-11
Payer: COMMERCIAL

## 2019-03-11 LAB
T4 FREE: 1.56 NG/DL (ref 0.93–1.76)
TSH SERPL DL<=0.05 MIU/L-ACNC: 1.28 UIU/ML (ref 0.4–4.2)

## 2019-03-11 PROCEDURE — 84439 ASSAY OF FREE THYROXINE: CPT

## 2019-03-11 PROCEDURE — 36415 COLL VENOUS BLD VENIPUNCTURE: CPT

## 2019-03-11 PROCEDURE — 84443 ASSAY THYROID STIM HORMONE: CPT

## 2019-04-08 ENCOUNTER — HOSPITAL ENCOUNTER (OUTPATIENT)
Age: 44
Discharge: HOME | End: 2019-04-08
Payer: COMMERCIAL

## 2019-04-08 ENCOUNTER — OFFICE VISIT (OUTPATIENT)
Dept: FAMILY MEDICINE CLINIC | Age: 44
End: 2019-04-08
Payer: COMMERCIAL

## 2019-04-08 VITALS
WEIGHT: 288 LBS | BODY MASS INDEX: 53 KG/M2 | RESPIRATION RATE: 16 BRPM | HEART RATE: 76 BPM | SYSTOLIC BLOOD PRESSURE: 135 MMHG | HEIGHT: 62 IN | DIASTOLIC BLOOD PRESSURE: 80 MMHG | OXYGEN SATURATION: 98 % | TEMPERATURE: 98 F

## 2019-04-08 DIAGNOSIS — E55.9 VITAMIN D DEFICIENCY: ICD-10-CM

## 2019-04-08 DIAGNOSIS — I10 ESSENTIAL HYPERTENSION: ICD-10-CM

## 2019-04-08 DIAGNOSIS — R60.0 BILATERAL LEG EDEMA: ICD-10-CM

## 2019-04-08 DIAGNOSIS — R05.8 ALLERGIC COUGH: ICD-10-CM

## 2019-04-08 DIAGNOSIS — R60.0 BILATERAL LEG EDEMA: Primary | ICD-10-CM

## 2019-04-08 DIAGNOSIS — E89.0 POSTOPERATIVE HYPOTHYROIDISM: ICD-10-CM

## 2019-04-08 LAB
ALBUMIN SERPL-MCNC: 3.9 G/DL (ref 3.5–5.1)
ALP BLD-CCNC: 69 U/L (ref 38–126)
ALT SERPL-CCNC: 14 U/L (ref 11–66)
ANION GAP SERPL CALCULATED.3IONS-SCNC: 13 MEQ/L (ref 8–16)
AST SERPL-CCNC: 17 U/L (ref 5–40)
BASOPHILS # BLD: 0.7 %
BASOPHILS ABSOLUTE: 0 THOU/MM3 (ref 0–0.1)
BILIRUB SERPL-MCNC: 0.2 MG/DL (ref 0.3–1.2)
BUN BLDV-MCNC: 15 MG/DL (ref 7–22)
CALCIUM SERPL-MCNC: 9.1 MG/DL (ref 8.5–10.5)
CHLORIDE BLD-SCNC: 98 MEQ/L (ref 98–111)
CHOLESTEROL, TOTAL: 155 MG/DL (ref 100–199)
CO2: 29 MEQ/L (ref 23–33)
CREAT SERPL-MCNC: 0.7 MG/DL (ref 0.4–1.2)
EOSINOPHIL # BLD: 0.9 %
EOSINOPHILS ABSOLUTE: 0 THOU/MM3 (ref 0–0.4)
ERYTHROCYTE [DISTWIDTH] IN BLOOD BY AUTOMATED COUNT: 15.3 % (ref 11.5–14.5)
ERYTHROCYTE [DISTWIDTH] IN BLOOD BY AUTOMATED COUNT: 50.3 FL (ref 35–45)
GFR SERPL CREATININE-BSD FRML MDRD: > 90 ML/MIN/1.73M2
GLUCOSE BLD-MCNC: 92 MG/DL (ref 70–108)
HCT VFR BLD CALC: 34.5 % (ref 37–47)
HDLC SERPL-MCNC: 45 MG/DL
HEMOGLOBIN: 11.3 GM/DL (ref 12–16)
IMMATURE GRANS (ABS): 0.01 THOU/MM3 (ref 0–0.07)
IMMATURE GRANULOCYTES: 0.2 %
LDL CHOLESTEROL CALCULATED: 93 MG/DL
LYMPHOCYTES # BLD: 36.5 %
LYMPHOCYTES ABSOLUTE: 1.6 THOU/MM3 (ref 1–4.8)
MCH RBC QN AUTO: 29.6 PG (ref 26–33)
MCHC RBC AUTO-ENTMCNC: 32.8 GM/DL (ref 32.2–35.5)
MCV RBC AUTO: 90.3 FL (ref 81–99)
MONOCYTES # BLD: 15.1 %
MONOCYTES ABSOLUTE: 0.7 THOU/MM3 (ref 0.4–1.3)
NUCLEATED RED BLOOD CELLS: 0 /100 WBC
PLATELET # BLD: 255 THOU/MM3 (ref 130–400)
PMV BLD AUTO: 11.9 FL (ref 9.4–12.4)
POTASSIUM SERPL-SCNC: 2.9 MEQ/L (ref 3.5–5.2)
RBC # BLD: 3.82 MILL/MM3 (ref 4.2–5.4)
SEG NEUTROPHILS: 46.6 %
SEGMENTED NEUTROPHILS ABSOLUTE COUNT: 2.1 THOU/MM3 (ref 1.8–7.7)
SODIUM BLD-SCNC: 140 MEQ/L (ref 135–145)
TOTAL PROTEIN: 7.5 G/DL (ref 6.1–8)
TRIGL SERPL-MCNC: 84 MG/DL (ref 0–199)
TSH SERPL DL<=0.05 MIU/L-ACNC: 0.68 UIU/ML (ref 0.4–4.2)
VITAMIN D 25-HYDROXY: 33 NG/ML (ref 30–100)
WBC # BLD: 4.4 THOU/MM3 (ref 4.8–10.8)

## 2019-04-08 PROCEDURE — 36415 COLL VENOUS BLD VENIPUNCTURE: CPT

## 2019-04-08 PROCEDURE — 99214 OFFICE O/P EST MOD 30 MIN: CPT | Performed by: NURSE PRACTITIONER

## 2019-04-08 PROCEDURE — 80061 LIPID PANEL: CPT

## 2019-04-08 PROCEDURE — 85025 COMPLETE CBC W/AUTO DIFF WBC: CPT

## 2019-04-08 PROCEDURE — 80053 COMPREHEN METABOLIC PANEL: CPT

## 2019-04-08 PROCEDURE — 82306 VITAMIN D 25 HYDROXY: CPT

## 2019-04-08 RX ORDER — HYDROCHLOROTHIAZIDE 25 MG/1
25 TABLET ORAL DAILY
Qty: 30 TABLET | Refills: 3 | Status: SHIPPED | OUTPATIENT
Start: 2019-04-08 | End: 2019-04-29 | Stop reason: SDUPTHER

## 2019-04-08 RX ORDER — LOSARTAN POTASSIUM 100 MG/1
100 TABLET ORAL DAILY
Qty: 30 TABLET | Refills: 3 | Status: SHIPPED | OUTPATIENT
Start: 2019-04-08 | End: 2019-04-29 | Stop reason: SDUPTHER

## 2019-04-08 RX ORDER — METOLAZONE 5 MG/1
5 TABLET ORAL DAILY
Qty: 90 TABLET | Refills: 1 | Status: SHIPPED | OUTPATIENT
Start: 2019-04-08 | End: 2019-04-29 | Stop reason: SDUPTHER

## 2019-04-08 ASSESSMENT — PATIENT HEALTH QUESTIONNAIRE - PHQ9
SUM OF ALL RESPONSES TO PHQ QUESTIONS 1-9: 0
1. LITTLE INTEREST OR PLEASURE IN DOING THINGS: 0
SUM OF ALL RESPONSES TO PHQ9 QUESTIONS 1 & 2: 0
2. FEELING DOWN, DEPRESSED OR HOPELESS: 0
SUM OF ALL RESPONSES TO PHQ QUESTIONS 1-9: 0

## 2019-04-08 ASSESSMENT — ENCOUNTER SYMPTOMS
RHINORRHEA: 0
CHEST TIGHTNESS: 0
ABDOMINAL PAIN: 0
PHOTOPHOBIA: 0
VOMITING: 0
SHORTNESS OF BREATH: 0
COUGH: 1
DIARRHEA: 0
BACK PAIN: 0
TROUBLE SWALLOWING: 0
WHEEZING: 0
COLOR CHANGE: 0
EYE PAIN: 0
SORE THROAT: 0

## 2019-04-08 NOTE — PROGRESS NOTES
Health Maintenance Due   Topic Date Due    HIV screen  11/22/1990    DTaP/Tdap/Td vaccine (1 - Tdap) 11/22/1994    Potassium monitoring  07/03/2018    Creatinine monitoring  07/03/2018

## 2019-04-08 NOTE — PROGRESS NOTES
[address] [facility] [address]  Dept: [phone]  Dept Fax: [phone]  Loc: [address] [facility]       Chief Complaint   Patient presents with    Leg Swelling     both legs but more so in left leg       Nurses Notes reviewed and I agree except as noted in the HPI. HISTORY OF PRESENT ILLNESS   Nasim Agosto is a 37 y.o. female who presents with c/o leg swelling. Acute on chronic. States worsened over the past couple weeks. Left worse than right. No pain. Has noted left upper/outer leg numbness. Worse with prolonged sitting. 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 Swelling improves after work/on weekends. No chest pain/SOB. No recent surgery. No long trips. States she has been taking her medications as directed. No change in diet. She does add salt while cooking. Will try to avoid eating salty foods. \"Trying to do better. \"  Rarely eats fast food. Drinks tea (caffeinated) roughly 3 times per week. Drinks > 64 oz water in a day. No exercise at this time. Plans to go back to school for cosmetology (wants to style hair). Needs refill of her Losartan, HCTZ, Metolazone, Vitamin D. Taking Vitamin D at 6,000 units daily. Last vitamin D level 35 on 1/2018. She is not taking her Zoloft. Patient also notes a cough. Onset 1 week ago, unchanged. Cough is intermittent, dry. Cough is worse in AM and at night. Associated rhinorrhea/PND. No sinus congestion, pain, or pressure. No fever, wheezing, or SOB. No treatment prior to arrival.    No additional complaints today. REVIEW OF SYSTEMS     Review of Systems   Constitutional: Negative for activity change, appetite change, chills, diaphoresis, fatigue, fever and unexpected weight change.    HENT: Negative for Allergies. FAMILY HISTORY     Patient's family history includes COPD in her mother; Cancer in her maternal uncle; Diabetes in her brother, maternal grandmother, and mother; Emphysema in her mother; Heart Disease in her mother; High Blood Pressure in her maternal grandmother and mother; No Known Problems in her sister; Other in her maternal grandfather and mother; Thyroid Disease in her mother. SOCIAL HISTORY     Patient  reports that she has never smoked. She has never used smokeless tobacco. She reports that she does not drink alcohol or use drugs. PHYSICAL EXAM     VITALS  BP: 135/80, Temp: 98 °F (36.7 °C), Pulse: 76, Resp: 16, SpO2: 98 %  Physical Exam   Constitutional: She is oriented to person, place, and time. Vital signs are normal. She appears well-developed and well-nourished. She is cooperative. Non-toxic appearance. She does not have a sickly appearance. She does not appear ill. No distress. HENT:   Head: Normocephalic and atraumatic. Right Ear: Hearing, tympanic membrane, external ear and ear canal normal.   Left Ear: Hearing, tympanic membrane, external ear and ear canal normal.   Nose: Nose normal. No mucosal edema or rhinorrhea. Right sinus exhibits no maxillary sinus tenderness and no frontal sinus tenderness. Left sinus exhibits no maxillary sinus tenderness and no frontal sinus tenderness. Mouth/Throat: Uvula is midline, oropharynx is clear and moist and mucous membranes are normal. No trismus in the jaw. No uvula swelling. No oropharyngeal exudate, posterior oropharyngeal edema, posterior oropharyngeal erythema or tonsillar abscesses. Eyes: Pupils are equal, round, and reactive to light. Conjunctivae and EOM are normal. Right conjunctiva is not injected. Right conjunctiva has no hemorrhage. Left conjunctiva is not injected. Left conjunctiva has no hemorrhage. No scleral icterus. Neck: Trachea normal and normal range of motion. Neck supple. No thyromegaly present. Cardiovascular: Normal rate, regular rhythm and normal heart sounds. No extrasystoles are present. PMI is not displaced. Exam reveals no gallop and no friction rub. No murmur heard. Pulses:       Dorsalis pedis pulses are 2+ on the right side, and 2+ on the left side. Posterior tibial pulses are 2+ on the right side, and 2+ on the left side. Pulmonary/Chest: Effort normal and breath sounds normal. No respiratory distress. Musculoskeletal:        Right lower leg: She exhibits swelling and edema (2+. pitting). She exhibits no tenderness. Left lower leg: She exhibits swelling and edema (2+ pitting). She exhibits no tenderness. Feet:   Right Foot:   Skin Integrity: Negative for skin breakdown or erythema. Left Foot:   Skin Integrity: Negative for skin breakdown or erythema. Lymphadenopathy:        Head (right side): No submental, no submandibular, no tonsillar, no preauricular, no posterior auricular and no occipital adenopathy present. Head (left side): No submental, no submandibular, no tonsillar, no preauricular, no posterior auricular and no occipital adenopathy present. She has no cervical adenopathy. Right: No supraclavicular adenopathy present. Left: No supraclavicular adenopathy present. Neurological: She is alert and oriented to person, place, and time. She is not disoriented. Skin: Skin is warm, dry and intact. Capillary refill takes less than 2 seconds. No bruising, no ecchymosis, no petechiae and no rash noted. She is not diaphoretic. No erythema. No pallor. Skin warm and dry to touch, no rashes noted on exposed surfaces. Nursing note and vitals reviewed. DIAGNOSTIC RESULTS   Labs:No results found for this visit on 04/08/19. IMAGING:  No orders to display       No images are attached to the encounter or orders placed in the encounter.     CLINICAL COURSE COURSE:     Vitals:    04/08/19 0908   BP: 135/80   Site: Left Upper Arm   Position: Sitting   Cuff Size: Large Adult   Pulse: 76   Resp: 16   Temp: 98 °F (36.7 °C)   TempSrc: Oral   SpO2: 98%   Weight: 288 lb (130.6 kg)   Height: 5' 1.81\" (1.57 m)         PROCEDURES:  None  FINAL IMPRESSION      1. Bilateral leg edema    2. Vitamin D deficiency    3. Essential hypertension    4. Allergic cough         DISPOSITION/PLAN     Bilateral leg edema - Left worse than right. Edema likely dependent. No leg/calf pain. Pulses palp 2+ bilateral.  Color skin/temp equal bilateral.  Patient encouraged to walk 20-30 minutes 4-5 days per week. Dr. Holly Riggs. had considered echo if swelling persist.  Discuss at next visit with Mary Humphrey on 4/26/19. Refill HCTZ and Metolazone. Rx compression stockings. No added salt to food. Possible referral to vascular if no better. Patient states she believes swelling due to work. She is requesting reduced hours. Advised conservative treatment at this time. Discuss at next visit. Vit D def - Taking 5-6,000 units daily. Has not had her levels checked in over a year. Order placed to recheck. Refill Vit D.    HTN - Refill Losartan, continue to monitor. No added salt. Increase water. Allergic cough - Cough secondary to PND. OTC claritin/zyrtec/allegra. Possible mucinex-DM. Increase fluids. If worse possible chest XR. If any distress go to ER. PATIENT REFERRED TO:    Follow up with PCP on 4/26/19 as scheduled, sooner if needed.     DISCHARGE MEDICATIONS:  New Prescriptions    ELASTIC BANDAGES & SUPPORTS (MEDICAL COMPRESSION STOCKINGS) MISC    1 each by Does not apply route daily as needed (leg swelling)    VITAMIN D-3 (CHOLECALCIFEROL) 5000 UNITS TABS    Take 1 tablet by mouth daily         Electronically signed by BRETT Rowland CNP on 4/8/2019 at 12:19 PM

## 2019-04-08 NOTE — PATIENT INSTRUCTIONS
Patient Education        Medications as prescribed. Wear stockings while at work/when on feet for long periods. No added salt. Try Mrs. Barboza. If leg swelling worsens or you have any leg pain, possible ultrasound to rule out blood clot. Possible referral to [facility]. Continue water intake. Try to go on a 20-30 minute walk at least 4-5 days a week. Claritin/Zyrtec/Allegra for cough due to drainage. Even consider mucinex to thin mucus. Possible chest x-ray    Follow up in 2-4 weeks, sooner if needed. Leg and Ankle Edema: Care Instructions  Your Care Instructions  Swelling in the legs, ankles, and feet is called edema. It is common after you sit or stand for a while. Long plane flights or car rides often cause swelling in the legs and feet. You may also have swelling if you have to stand for long periods of time at your job. Problems with the veins in the legs (varicose veins) and changes in hormones can also cause swelling. Sometimes the swelling in the ankles and feet is caused by a more serious problem, such as heart failure, infection, blood clots, or liver or kidney disease. Follow-up care is a key part of your treatment and safety. Be sure to make and go to all appointments, and call your doctor if you are having problems. It's also a good idea to know your test results and keep a list of the medicines you take. How can you care for yourself at home? · If your doctor gave you medicine, take it as prescribed. Call your doctor if you think you are having a problem with your medicine. · Whenever you are resting, raise your legs up. Try to keep the swollen area higher than the level of your heart. · Take breaks from standing or sitting in one position. ? Walk around to increase the blood flow in your lower legs. ? Move your feet and ankles often while you stand, or tighten and relax your leg muscles. · Wear support stockings.  Put them on in the morning, before swelling gets worse.  · Eat a balanced diet. Lose weight if you need to. · Limit the amount of salt (sodium) in your diet. Salt holds fluid in the body and may increase swelling. When should you call for help? Call 911 anytime you think you may need emergency care. For example, call if:    · You have symptoms of a blood clot in your lung (called a pulmonary embolism). These may include:  ? Sudden chest pain. ? Trouble breathing. ? Coughing up blood.    Call your doctor now or seek immediate medical care if:    · You have signs of a blood clot, such as:  ? Pain in your calf, back of the knee, thigh, or groin. ? Redness and swelling in your leg or groin.     · You have symptoms of infection, such as:  ? Increased pain, swelling, warmth, or redness. ? Red streaks or pus. ? A fever.    Watch closely for changes in your health, and be sure to contact your doctor if:    · Your swelling is getting worse.     · You have new or worsening pain in your legs.     · You do not get better as expected. Where can you learn more? Go to [email or web address]://[email or web address]. org and sign in to your [employer] account. Enter N175 in the [employer] box to learn more about \"Leg and Ankle Edema: Care Instructions. \"     If you do not have an account, please click on the \"Sign Up Now\" link. Current as of: September 23, 2018  Content Version: 11.9  © 1792-8384 [employer]. Care instructions adapted under license by [facility] ([facility]). If you have questions about a medical condition or this instruction, always ask your healthcare professional. [place] [address] any warranty or liability for your use of this information. Patient Education        DASH Diet: Care Instructions  Your Care Instructions    The DASH diet is an eating plan that can help lower your blood pressure. DASH stands for Dietary Approaches to Stop Hypertension. Hypertension is high blood pressure.   The DASH diet focuses on eating foods that are high in calcium, potassium, and magnesium. These nutrients can lower blood pressure. The foods that are highest in these nutrients are fruits, vegetables, low-fat dairy products, nuts, seeds, and legumes. But taking calcium, potassium, and magnesium supplements instead of eating foods that are high in those nutrients does not have the same effect. The DASH diet also includes whole grains, fish, and poultry. The DASH diet is one of several lifestyle changes your doctor may recommend to lower your high blood pressure. Your doctor may also want you to decrease the amount of sodium in your diet. Lowering sodium while following the DASH diet can lower blood pressure even further than just the DASH diet alone. Follow-up care is a key part of your treatment and safety. Be sure to make and go to all appointments, and call your doctor if you are having problems. It's also a good idea to know your test results and keep a list of the medicines you take. How can you care for yourself at home? Following the DASH diet  · Eat 4 to 5 servings of fruit each day. A serving is 1 medium-sized piece of fruit, ½ cup chopped or canned fruit, 1/4 cup dried fruit, or 4 ounces (½ cup) of fruit juice. Choose fruit more often than fruit juice. · Eat 4 to 5 servings of vegetables each day. A serving is 1 cup of lettuce or raw leafy vegetables, ½ cup of chopped or cooked vegetables, or 4 ounces (½ cup) of vegetable juice. Choose vegetables more often than vegetable juice. · Get 2 to 3 servings of low-fat and fat-free dairy each day. A serving is 8 ounces of milk, 1 cup of yogurt, or 1 ½ ounces of cheese. · Eat 6 to 8 servings of grains each day. A serving is 1 slice of bread, 1 ounce of dry cereal, or ½ cup of cooked rice, pasta, or cooked cereal. Try to choose whole-grain products as much as possible. · Limit lean meat, poultry, and fish to 2 servings each day. A serving is 3 ounces, about the size of a deck of cards.   · Eat 4 to 5 servings of nuts, seeds, and legumes (cooked dried beans, lentils, and split peas) each week. A serving is 1/3 cup of nuts, 2 tablespoons of seeds, or ½ cup of cooked beans or peas. · Limit fats and oils to 2 to 3 servings each day. A serving is 1 teaspoon of vegetable oil or 2 tablespoons of salad dressing. · Limit sweets and added sugars to 5 servings or less a week. A serving is 1 tablespoon jelly or jam, ½ cup sorbet, or 1 cup of lemonade. · Eat less than 2,300 milligrams (mg) of sodium a day. If you limit your sodium to 1,500 mg a day, you can lower your blood pressure even more. Tips for success  · Start small. Do not try to make dramatic changes to your diet all at once. You might feel that you are missing out on your favorite foods and then be more likely to not follow the plan. Make small changes, and stick with them. Once those changes become habit, add a few more changes. · Try some of the following:  ? Make it a goal to eat a fruit or vegetable at every meal and at snacks. This will make it easy to get the recommended amount of fruits and vegetables each day. ? Try yogurt topped with fruit and nuts for a snack or healthy dessert. ? Add lettuce, tomato, cucumber, and onion to sandwiches. ? Combine a ready-made pizza crust with low-fat mozzarella cheese and lots of vegetable toppings. Try using tomatoes, squash, spinach, broccoli, carrots, cauliflower, and onions. ? Have a variety of cut-up vegetables with a low-fat dip as an appetizer instead of chips and dip. ? Sprinkle sunflower seeds or chopped almonds over salads. Or try adding chopped walnuts or almonds to cooked vegetables. ? Try some vegetarian meals using beans and peas. Add garbanzo or kidney beans to salads. Make burritos and tacos with mashed hewitt beans or black beans. Where can you learn more? Go to https://connie.[employer]. org and sign in to your [employer] account.  Enter Z115 in the [facility] box to learn more about \"DASH Diet: Care Instructions. \"     If you do not have an account, please click on the \"Sign Up Now\" link. Current as of: July 22, 2018  Content Version: 11.9  © 0834-3229 [employer]. Care instructions adapted under license by [facility] ([facility]). If you have questions about a medical condition or this instruction, always ask your healthcare professional. [address] any warranty or liability for your use of this information.

## 2019-04-09 ENCOUNTER — TELEPHONE (OUTPATIENT)
Dept: FAMILY MEDICINE CLINIC | Age: 44
End: 2019-04-09

## 2019-04-09 DIAGNOSIS — E87.6 HYPOKALEMIA: Primary | ICD-10-CM

## 2019-04-09 RX ORDER — POTASSIUM CHLORIDE 1500 MG/1
20 TABLET, FILM COATED, EXTENDED RELEASE ORAL
Qty: 30 TABLET | Refills: 0 | Status: SHIPPED | OUTPATIENT
Start: 2019-04-09 | End: 2019-05-10 | Stop reason: SDUPTHER

## 2019-04-09 NOTE — TELEPHONE ENCOUNTER
----- Message from BRETT Rankin CNP sent at 4/9/2019  3:09 PM EDT -----  Labs look good overall. Vitamin D borderline so continue dose at 5,000 units daily as prescribed. HDL (good cholesterol) decreased. Maintain proper exercise, try 30 minutes at least 4-5 days per week. Decrease carbs, sugar, and alcohol as well as a diet with increased fruits/vegetables should help. Also no added salt while cooking. Your Potassium level is low at 2.9. I would like her to start Potassium supplements at 20 MEQ daily. Sent to [facility] on [address]. We need to discuss her diuretic medications. If she can see us back in the next 1-3 days that would be great. Due to the leg edema I would like to order an EKG and see if we can reorder the ECHO to be done.

## 2019-04-09 NOTE — TELEPHONE ENCOUNTER
----- Message from Stephen Cordero DO sent at 4/9/2019 10:09 AM EDT -----  The bloodwork showed some abnormalities - the potassium was a low and there was some anemia - with the leg edema we may want to come in this week to add some potassium and go over the labs before the 26th

## 2019-04-12 ENCOUNTER — OFFICE VISIT (OUTPATIENT)
Dept: FAMILY MEDICINE CLINIC | Age: 44
End: 2019-04-12
Payer: COMMERCIAL

## 2019-04-12 VITALS
OXYGEN SATURATION: 100 % | HEIGHT: 62 IN | HEART RATE: 88 BPM | BODY MASS INDEX: 53.92 KG/M2 | TEMPERATURE: 98.8 F | WEIGHT: 293 LBS | RESPIRATION RATE: 12 BRPM | SYSTOLIC BLOOD PRESSURE: 115 MMHG | DIASTOLIC BLOOD PRESSURE: 67 MMHG

## 2019-04-12 DIAGNOSIS — D64.9 ANEMIA, UNSPECIFIED TYPE: ICD-10-CM

## 2019-04-12 DIAGNOSIS — M79.89 LEG SWELLING: ICD-10-CM

## 2019-04-12 DIAGNOSIS — E87.6 HYPOKALEMIA: Primary | ICD-10-CM

## 2019-04-12 DIAGNOSIS — Z12.39 BREAST CANCER SCREENING: ICD-10-CM

## 2019-04-12 PROCEDURE — 93000 ELECTROCARDIOGRAM COMPLETE: CPT | Performed by: NURSE PRACTITIONER

## 2019-04-12 PROCEDURE — 99214 OFFICE O/P EST MOD 30 MIN: CPT | Performed by: NURSE PRACTITIONER

## 2019-04-12 ASSESSMENT — ENCOUNTER SYMPTOMS
NAUSEA: 0
RHINORRHEA: 0
EYE REDNESS: 0
DIARRHEA: 0
SHORTNESS OF BREATH: 0
COUGH: 0
ANAL BLEEDING: 0
EYE DISCHARGE: 0
ABDOMINAL PAIN: 0
SORE THROAT: 0
COLOR CHANGE: 0
BLOOD IN STOOL: 0
CONSTIPATION: 0
ABDOMINAL DISTENTION: 0

## 2019-04-12 NOTE — PATIENT INSTRUCTIONS
1. You may receive a survey about your visit with us today. The feedback from our patients helps us identify what is working well and where the service to all patients can be enhanced. Thank you! 2. Please bring in ALL medications BOTTLES, including inhalers, herbal supplements, over the counter, prescribed & non-prescribed medicine. The office would like actual medication bottles and a list.   3. Please note our OFFICE POLICIES:   1. Prior to getting your labs drawn, please check with your insurance company for benefits and eligibility of lab services. Often, insurance companies cover certain tests for preventative visits only. It is patient's responsibility to see what is covered. 2. We are unable to change a diagnosis after the test has been performed. 3. Lab orders will not be re-printed. Please hold onto your original lab orders and take them to your lab to be completed. 4. If you no show your scheduled appointment three times, you will be dismissed from this practice. 5. Reschedules must be completed 24 hours prior to your schedule appointment. 4. If the list below has been completed, PLEASE FAX RECORDS TO OUR OFFICE @ [phone]. Once the records have been received we will update your records at our office:  Health Maintenance Due   Topic Date Due    HIV screen  11/22/1990    DTaP/Tdap/Td vaccine (1 - Tdap) 11/22/1994       Schedule your 2018/2019 flu vaccine with Dr. Valencia West call [phone]!   [facility], for anyone 9 years or older   [address]   Every Monday   8:00am-11:00am and 1:00pm-3:00pm    · Please  the potassium and take once daily  · Wear the compression stockings daily  · Will send in an iron supplement to take daily  · Will recheck the CBC, iron level, and potassium in 3-4 weeks  · Will reorder the ECHO  · EKG in office today  · If the potassium supplement does not bring the numbers up we will discuss removing one of the the specific cause of your anemia will help your doctor find the right treatment for you. Follow-up care is a key part of your treatment and safety. Be sure to make and go to all appointments, and call your doctor if you are having problems. It's also a good idea to know your test results and keep a list of the medicines you take. How can you care for yourself at home? · Take your medicines exactly as prescribed. Call your doctor if you think you are having a problem with your medicine. · If your doctor recommends iron pills, take them as directed:  ? Try to take the pills on an empty stomach about 1 hour before or 2 hours after meals. But you may need to take iron with food to avoid an upset stomach. ? Do not take antacids or drink milk or caffeine drinks (such as coffee, tea, or cola) at the same time or within 2 hours of the time that you take your iron. They can make it hard for your body to absorb the iron. ? Vitamin C (from food or supplements) helps your body absorb iron. Try taking iron pills with a glass of orange juice or some other food that is high in vitamin C, such as citrus fruits. ? Iron pills may cause stomach problems, such as heartburn, nausea, diarrhea, constipation, and cramps. Be sure to drink plenty of fluids, and include fruits, vegetables, and fiber in your diet each day. Iron pills often make your bowel movements dark or green. ? If you forget to take an iron pill, do not take a double dose of iron the next time you take a pill. ? Keep iron pills out of the reach of small children. An overdose of iron can be very dangerous. · Follow your doctor's advice about eating iron-rich foods. These include red meat, shellfish, poultry, eggs, beans, raisins, whole-grain bread, and leafy green vegetables. · Steam vegetables to help them keep their iron content. When should you call for help? Call 911 anytime you think you may need emergency care.  For example, call if:    · You have symptoms of a heart attack. These may include:  ? Chest pain or pressure, or a strange feeling in the chest.  ? Sweating. ? Shortness of breath. ? Nausea or vomiting. ? Pain, pressure, or a strange feeling in the back, neck, jaw, or upper belly or in one or both shoulders or arms. ? Lightheadedness or sudden weakness. ? A fast or irregular heartbeat. After you call 911, the  may tell you to chew 1 adult-strength or 2 to 4 low-dose aspirin. Wait for an ambulance. Do not try to drive yourself.     · You passed out (lost consciousness).    Call your doctor now or seek immediate medical care if:    · You have new or increased shortness of breath.     · You are dizzy or lightheaded, or you feel like you may faint.     · Your fatigue and weakness continue or get worse.     · You have any abnormal bleeding, such as:  ? Nosebleeds. ? Vaginal bleeding that is different (heavier, more frequent, at a different time of the month) than what you are used to.  ? Bloody or black stools, or rectal bleeding. ? Bloody or pink urine.    Watch closely for changes in your health, and be sure to contact your doctor if:    · You do not get better as expected. Where can you learn more? Go to https://Delphi.Sayduck. org and sign in to your Biofortuna account. Enter R301 in the [facility] box to learn more about \"Anemia: Care Instructions. \"     If you do not have an account, please click on the \"Sign Up Now\" link. Current as of: May 6, 2018  Content Version: 11.9  © 7717-1753 [employer], [employer]orporated. Care instructions adapted under license by [facility] ([facility]). If you have questions about a medical condition or this instruction, always ask your healthcare professional. [place] [address] any warranty or liability for your use of this information.          Patient Education        Hypokalemia: Care Instructions  Your Care Instructions    Hypokalemia (say \"mv-hs-hau-CHECO-evon-uh\") is a low level of potassium. The heart, muscles, kidneys, and nervous system all need potassium to work well. This problem has many different causes. Kidney problems, diet, and medicines like diuretics and laxatives can cause it. So can vomiting or diarrhea. In some cases, cancer is the cause. Your doctor may do tests to find the cause of your low potassium levels. You may need medicines to bring your potassium levels back to normal. You may also need regular blood tests to check your potassium. If you have very low potassium, you may need intravenous (IV) medicines. You also may need tests to check the electrical activity of your heart. Heart problems caused by low potassium levels can be very serious. Follow-up care is a key part of your treatment and safety. Be sure to make and go to all appointments, and call your doctor if you are having problems. It's also a good idea to know your test results and keep a list of the medicines you take. How can you care for yourself at home? · If your doctor recommends it, eat foods that have a lot of potassium. These include fresh fruits, juices, and vegetables. They also include nuts, beans, and milk. · Be safe with medicines. If your doctor prescribes medicines or potassium supplements, take them exactly as directed. Call your doctor if you have any problems with your medicines. · Get your potassium levels tested as often as your doctor tells you. When should you call for help? Call 911 anytime you think you may need emergency care. For example, call if:    · You feel like your heart is missing beats.  Heart problems caused by low potassium can cause death.     · You passed out (lost consciousness).     · You have a seizure.    Call your doctor now or seek immediate medical care if:    · You feel weak or unusually tired.     · You have severe arm or leg cramps.     · You have tingling or numbness.     · You feel sick to your stomach, or you vomit.     · You have belly [facility] ([facility]). If you have questions about a medical condition or this instruction, always ask your healthcare professional. [place] [address] any warranty or liability for your use of this information.

## 2019-04-12 NOTE — PROGRESS NOTES
Health Maintenance Due   Topic Date Due    HIV screen  11/22/1990    DTaP/Tdap/Td vaccine (1 - Tdap) 11/22/1994

## 2019-04-12 NOTE — PROGRESS NOTES
[address] [facility] [address] [facility]  [address]  Dept: [phone]  Dept Fax: [phone]: [phone]    Visit Date: 4/12/2019    Dixon Carney is a 37 y.o. female who presents today for:  Chief Complaint   Patient presents with    Follow-up    Discuss Labs     HPI:     Recent potassium level was 2.9 - 20 mEq of potassium was called in - she did not pick prescription yet. HDL - 45    Vitamin D3 - 33 - taking 6,000 u daily    Leg swelling is no better. Swelling first started a couple months ago - shortly after ligia she was put into mandatory overtime - she sits all day - the more she sits the more she swells. Her job requires her to sit 50-60 hours per week and this is contributing to the swelling. Towards the end of the weekend the leg tightness and swelling decreased but by Monday at work the swelling is back. She feels like sitting all day long is negatively impacting her health    She takes the HCTZ and the Zaroxolyn - in the morning - takes in on a regular bases - she sometimes cannot take it when working because she cannot be up going to the bathroom all the time. 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      HPI  Health Maintenance   Topic Date Due    HIV screen  11/22/1990    DTaP/Tdap/Td vaccine (1 - Tdap) 11/22/1994    Flu vaccine (Season Ended) 09/01/2019    Cervical cancer screen  02/02/2020    TSH testing  04/08/2020    Potassium monitoring  04/08/2020    Creatinine monitoring  04/08/2020    Lipid screen  04/08/2024    Pneumococcal 0-64 years Vaccine  Aged Out       Past Medical History:   Diagnosis Date    Anxiety     treated with tegretol in past    Heart murmur     Hypertension     Hypothyroidism 3/13/2015    Obesity due to excess calories 10/25/2016    PPD positive       Past Surgical History:   Procedure Laterality Date    LEEP  3/27/2015    OVARIAN CYST REMOVAL  2011    THYROIDECTOMY  12/31/2014    THYROIDECTOMY, COMPLETION  12/2015     Family History   Problem Relation Age of Onset    Diabetes Mother     Thyroid Disease Mother     High Blood Pressure Mother     Emphysema Mother     Other Mother     COPD Mother     Heart Disease Mother         ENLARGE HEART    Diabetes Brother     Diabetes Maternal Grandmother     High Blood Pressure Maternal Grandmother     Other Maternal Grandfather         COPD    Cancer Maternal Uncle     No Known Problems Sister      Social History     Tobacco Use    Smoking status: Never Smoker    Smokeless tobacco: Never Used   Substance Use Topics    Alcohol use: No     Alcohol/week: 0.0 oz      Current Outpatient Medications   Medication Sig Dispense Refill    potassium chloride (K-TAB) 20 MEQ TBCR extended release tablet Take 1 tablet by mouth daily (with breakfast) 30 tablet 0    metolazone (ZAROXOLYN) 5 MG tablet Take 1 tablet by mouth daily 90 tablet 1    losartan (COZAAR) 100 MG tablet Take 1 tablet by mouth daily 30 tablet 3    hydrochlorothiazide (HYDRODIURIL) 25 MG tablet Take 1 tablet by mouth daily 30 tablet 3    vitamin D-3 (CHOLECALCIFEROL) 5000 units TABS Take 1 tablet by mouth daily 30 tablet 3    Elastic Bandages & Supports (MEDICAL COMPRESSION STOCKINGS) MISC 1 each by Does not apply route daily as needed (leg swelling) 1 each 0    SYNTHROID 175 MCG tablet TAKE 1 TABLET BY MOUTH DAILY 90 tablet 1     No current facility-administered medications for this visit. No Known Allergies    Subjective:    Review of Systems   Constitutional: Negative for chills, fatigue and fever. HENT: Negative for congestion, ear pain, postnasal drip, rhinorrhea and sore throat. Eyes: Negative for discharge and redness. Respiratory: Negative for cough and shortness of breath. Cardiovascular: Positive for leg swelling. Negative for chest pain. Gastrointestinal: Negative for abdominal distention, abdominal pain, anal bleeding, blood in stool, constipation, diarrhea and nausea. Skin: Negative for color change and rash. Neurological: Negative for facial asymmetry, speech difficulty and weakness. Hematological: Does not bruise/bleed easily. Psychiatric/Behavioral: Negative for agitation and confusion. Objective:      Vitals:    04/12/19 1005   BP: 115/67   Pulse: 88   Resp: 12   Temp: 98.8 °F (37.1 °C)   TempSrc: Oral   SpO2: 100%   Weight: 297 lb 9.6 oz (135 kg)   Height: 5' 1.81\" (1.57 m)       Body mass index is 54.77 kg/m². Wt Readings from Last 3 Encounters:   04/12/19 297 lb 9.6 oz (135 kg)   04/08/19 288 lb (130.6 kg)   09/25/18 288 lb 12.8 oz (131 kg)     BP Readings from Last 3 Encounters:   04/12/19 115/67   04/08/19 135/80   09/25/18 134/82       Physical Exam   Constitutional: She is oriented to person, place, and time. She appears well-developed and well-nourished. No distress. HENT:   Head: Normocephalic and atraumatic. Right Ear: Hearing and external ear normal. No swelling. Left Ear: Hearing and external ear normal. No swelling. Nose: No mucosal edema or rhinorrhea. Right sinus exhibits no maxillary sinus tenderness and no frontal sinus tenderness. Left sinus exhibits no maxillary sinus tenderness and no frontal sinus tenderness. Mouth/Throat: Oropharynx is clear and moist. No oropharyngeal exudate or posterior oropharyngeal erythema. Eyes: Pupils are equal, round, and reactive to light. Conjunctivae are normal. Right eye exhibits no discharge. Left eye exhibits no discharge. Neck: Normal range of motion. Cardiovascular: Normal rate and regular rhythm. No murmur heard. Bilateral lower leg swelling present   Pulmonary/Chest: Effort normal and breath sounds normal. No respiratory distress.    Musculoskeletal: She exhibits no tenderness or deformity. Full ROM of upper and lower extremities    Lymphadenopathy:     She has no cervical adenopathy. Neurological: She is alert and oriented to person, place, and time. Coordination and gait normal.   Skin: Skin is warm and dry. No rash noted. She is not diaphoretic. No cyanosis. Nails show no clubbing. Psychiatric: She has a normal mood and affect. Her speech is normal and behavior is normal. Judgment and thought content normal. Cognition and memory are normal.       Lab Results   Component Value Date    WBC 4.4 (L) 04/08/2019    HGB 11.3 (L) 04/08/2019    HCT 34.5 (L) 04/08/2019     04/08/2019    CHOL 155 04/08/2019    TRIG 84 04/08/2019    HDL 45 04/08/2019    LDLCALC 93 04/08/2019    AST 17 04/08/2019     04/08/2019    K 2.9 (L) 04/08/2019    CL 98 04/08/2019    CREATININE 0.7 04/08/2019    BUN 15 04/08/2019    CO2 29 04/08/2019    TSH 0.684 04/08/2019    LABA1C 5.4 01/08/2018    LABGLOM >90 04/08/2019    MG 2.0 07/23/2014    CALCIUM 9.1 04/08/2019    VITD25 33 04/08/2019       Assessment:       Diagnosis Orders   1. Hypokalemia  EKG 12 Lead    Potassium   2. Leg swelling  EKG 12 Lead    ECHO Limited   3. Anemia, unspecified type  CBC Auto Differential    Iron and TIBC   4. Breast cancer screening  ALEXIS DIGITAL SCREEN W OR WO CAD BILATERAL       Plan:   · Please  the potassium and take once daily  · Wear the compression stockings daily  · Will send in an iron supplement to take daily  · Will recheck the CBC, iron level, and potassium in 3-4 weeks  · Will reorder the ECHO  · EKG in office today  · If the potassium supplement does not bring the numbers up we will discuss removing one of the diuretics  · Can take a B-complex time-released 3 times daily to get the HDL up some    · Check you blood pressure at least 2 times daily.  Check while sitting down, feet flat on the ground, and arm at the level of your heart  · Please let us know if you blood pressure readings are 140/90 or

## 2019-04-15 ENCOUNTER — OFFICE VISIT (OUTPATIENT)
Dept: FAMILY MEDICINE CLINIC | Age: 44
End: 2019-04-15
Payer: COMMERCIAL

## 2019-04-15 VITALS
HEART RATE: 72 BPM | BODY MASS INDEX: 53.92 KG/M2 | WEIGHT: 293 LBS | RESPIRATION RATE: 12 BRPM | HEIGHT: 62 IN | OXYGEN SATURATION: 99 % | TEMPERATURE: 98.2 F | DIASTOLIC BLOOD PRESSURE: 65 MMHG | SYSTOLIC BLOOD PRESSURE: 133 MMHG

## 2019-04-15 DIAGNOSIS — D64.9 ANEMIA, UNSPECIFIED TYPE: ICD-10-CM

## 2019-04-15 DIAGNOSIS — E87.6 HYPOKALEMIA: ICD-10-CM

## 2019-04-15 DIAGNOSIS — R60.0 BILATERAL LEG EDEMA: ICD-10-CM

## 2019-04-15 DIAGNOSIS — J20.8 ACUTE VIRAL BRONCHITIS: Primary | ICD-10-CM

## 2019-04-15 DIAGNOSIS — Z11.4 ENCOUNTER FOR SCREENING FOR HIV: ICD-10-CM

## 2019-04-15 PROCEDURE — 99214 OFFICE O/P EST MOD 30 MIN: CPT | Performed by: NURSE PRACTITIONER

## 2019-04-15 RX ORDER — PNV NO.95/FERROUS FUM/FOLIC AC 28MG-0.8MG
325 TABLET ORAL DAILY
Qty: 30 TABLET | Refills: 0 | Status: SHIPPED | OUTPATIENT
Start: 2019-04-15 | End: 2019-05-10 | Stop reason: SDUPTHER

## 2019-04-15 RX ORDER — ALBUTEROL SULFATE 90 UG/1
2 AEROSOL, METERED RESPIRATORY (INHALATION) EVERY 4 HOURS PRN
Qty: 1 INHALER | Refills: 0 | Status: SHIPPED | OUTPATIENT
Start: 2019-04-15 | End: 2021-04-07 | Stop reason: ALTCHOICE

## 2019-04-15 RX ORDER — PREDNISONE 20 MG/1
20 TABLET ORAL DAILY
Qty: 5 TABLET | Refills: 0 | Status: SHIPPED | OUTPATIENT
Start: 2019-04-15 | End: 2019-04-20

## 2019-04-15 RX ORDER — DEXTROMETHORPHAN HYDROBROMIDE AND PROMETHAZINE HYDROCHLORIDE 15; 6.25 MG/5ML; MG/5ML
5 SYRUP ORAL 4 TIMES DAILY PRN
Qty: 120 ML | Refills: 0 | Status: SHIPPED | OUTPATIENT
Start: 2019-04-15 | End: 2019-04-22

## 2019-04-15 ASSESSMENT — ENCOUNTER SYMPTOMS
NAUSEA: 0
DIARRHEA: 0
COLOR CHANGE: 0
SHORTNESS OF BREATH: 0
CHEST TIGHTNESS: 0
COUGH: 1
CONSTIPATION: 0
TROUBLE SWALLOWING: 0
ABDOMINAL DISTENTION: 0
VOMITING: 0
RHINORRHEA: 0
SORE THROAT: 0
ABDOMINAL PAIN: 1
WHEEZING: 1

## 2019-04-15 NOTE — PROGRESS NOTES
[address] [facility] [address]  Dept: [phone]  Dept Fax: [phone]  Loc: [address] [facility]       Chief Complaint   Patient presents with    Cough    Abdominal Pain       Nurses Notes reviewed and I agree except as noted in the HPI. HISTORY OF PRESENT ILLNESS   Lucius Cervantes is a 37 y.o. female who presents with c/o cough. Onset greater than 1 week ago, worsening. Cough is intermittent, productive. Sputum thick/yellow at times. She also notes intermittent wheezing. Denies fever, chest pain, or SOB. Coughing spells with deep breathing. Associated abdominal pain, generalized. Pain with coughing. No changes in BM. No blood in stool. No pain at rest.    Recently diagnosed with hypokalemia. She has not started taking her Potassium supplement. Denies chest pain, SOB or palpitations. Bilateral leg edema - Slightly better today, but patient notes improvement during weekend. She purchased, but has not worn her compression stockings. Anemia - Needs Rx for Iron. States she will get her labs done today. She has not had her echo scheduled. She is due for HIV screen. No additional complaints today. REVIEW OF SYSTEMS     Review of Systems   Constitutional: Negative for chills, diaphoresis, fatigue and fever. HENT: Negative for congestion, ear pain, rhinorrhea, sore throat and trouble swallowing. Respiratory: Positive for cough and wheezing. Negative for chest tightness and shortness of breath. Cardiovascular: Positive for leg swelling (chronic). Negative for chest pain and palpitations. Gastrointestinal: Positive for abdominal pain (due to coughing). Negative for abdominal distention, constipation, diarrhea, nausea and vomiting. Musculoskeletal: Negative for myalgias, neck pain and neck stiffness. Skin: Negative for color change, pallor and rash.    Neurological: drugs.    PHYSICAL EXAM     VITALS  BP: 133/65, Temp: 98.2 °F (36.8 °C), Pulse: 72, Resp: 12, SpO2: 99 %  Physical Exam   Constitutional: She is oriented to person, place, and time. Vital signs are normal. She appears well-developed and well-nourished. She is cooperative. Non-toxic appearance. She does not have a sickly appearance. She does not appear ill. No distress. HENT:   Head: Normocephalic and atraumatic. Right Ear: Hearing, tympanic membrane, external ear and ear canal normal.   Left Ear: Hearing, tympanic membrane, external ear and ear canal normal.   Nose: Nose normal. No mucosal edema or rhinorrhea. Right sinus exhibits no maxillary sinus tenderness and no frontal sinus tenderness. Left sinus exhibits no maxillary sinus tenderness and no frontal sinus tenderness. Mouth/Throat: Uvula is midline, oropharynx is clear and moist and mucous membranes are normal. No trismus in the jaw. No uvula swelling. No oropharyngeal exudate, posterior oropharyngeal edema, posterior oropharyngeal erythema or tonsillar abscesses. Eyes: Right conjunctiva is not injected. Right conjunctiva has no hemorrhage. Left conjunctiva is not injected. Left conjunctiva has no hemorrhage. No scleral icterus. Neck: Trachea normal and normal range of motion. Neck supple. No thyromegaly present. Cardiovascular: Normal rate, regular rhythm and normal heart sounds. No extrasystoles are present. PMI is not displaced. Exam reveals no gallop and no friction rub. No murmur heard. Pulses:       Dorsalis pedis pulses are 2+ on the right side, and 2+ on the left side. Posterior tibial pulses are 2+ on the right side, and 2+ on the left side. Pulmonary/Chest: Effort normal and breath sounds normal. No respiratory distress. Musculoskeletal:        Right lower leg: She exhibits swelling and edema (2+, pitting). Left lower leg: She exhibits swelling and edema (2+, pitting). Lymphadenopathy:        Head (right side):  No

## 2019-04-15 NOTE — PATIENT INSTRUCTIONS
1. You may receive a survey about your visit with us today. The feedback from our patients helps us identify what is working well and where the service to all patients can be enhanced. Thank you! 2. Please bring in ALL medications BOTTLES, including inhalers, herbal supplements, over the counter, prescribed & non-prescribed medicine. The office would like actual medication bottles and a list.   3. Please note our OFFICE POLICIES:   1. Prior to getting your labs drawn, please check with your insurance company for benefits and eligibility of lab services. Often, insurance companies cover certain tests for preventative visits only. It is patient's responsibility to see what is covered. 2. We are unable to change a diagnosis after the test has been performed. 3. Lab orders will not be re-printed. Please hold onto your original lab orders and take them to your lab to be completed. 4. If you no show your scheduled appointment three times, you will be dismissed from this practice. 5. Reschedules must be completed 24 hours prior to your schedule appointment. 4. If the list below has been completed, PLEASE FAX RECORDS TO OUR OFFICE @ [phone]. Once the records have been received we will update your records at our office:  Health Maintenance Due   Topic Date Due    HIV screen  11/22/1990    DTaP/Tdap/Td vaccine (1 - Tdap) 11/22/1994       Schedule your 2018/2019 flu vaccine with Dr. Simona Ritter call [phone]! [facility] ([facility], for anyone 9 years or older   [address]   Every Monday   8:00am-11:00am and 1:00pm-3:00pm    Patient Education     Medications as prescribed. Please wear compression stockings. No added salt. Increase fluids. Continue Robitussin during day. Cough medication as prescribed for night time. Inhaler as needed for coughing spells/wheezing. Take Prednisone with food. Sputum culture pending, we will call with results.     Iron smoke. Smoking can make bronchitis worse. If you need help quitting, talk to your doctor about stop-smoking programs and medicines. These can increase your chances of quitting for good. When should you call for help? Call 911 anytime you think you may need emergency care. For example, call if:    · You have severe trouble breathing.    Call your doctor now or seek immediate medical care if:    · You have new or worse trouble breathing.     · You cough up dark brown or bloody mucus (sputum).     · You have a new or higher fever.     · You have a new rash.    Watch closely for changes in your health, and be sure to contact your doctor if:    · You cough more deeply or more often, especially if you notice more mucus or a change in the color of your mucus.     · You are not getting better as expected. Where can you learn more? Go to https://SinoHubpejarvisSphere Medical Holdingeb.TrackMaven. org and sign in to your PlayFilm account. Enter H333 in the YumDots box to learn more about \"Bronchitis: Care Instructions. \"     If you do not have an account, please click on the \"Sign Up Now\" link. Current as of: September 5, 2018  Content Version: 11.9  © 2433-1483 [employer], [employer]. Care instructions adapted under license by [facility] ([facility]). If you have questions about a medical condition or this instruction, always ask your healthcare professional. [address]  any warranty or liability for your use of this information.

## 2019-04-18 ENCOUNTER — TELEPHONE (OUTPATIENT)
Dept: FAMILY MEDICINE CLINIC | Age: 44
End: 2019-04-18

## 2019-04-18 LAB
GRAM STAIN RESULT: NORMAL
RESPIRATORY CULTURE: NORMAL

## 2019-04-18 NOTE — TELEPHONE ENCOUNTER
----- Message from BRETT Blue CNP sent at 4/18/2019 12:46 PM EDT -----  Sputum culture normal chris. Continue current medications. Follow up as scheduled on 5/10/19.

## 2019-04-22 ENCOUNTER — OFFICE VISIT (OUTPATIENT)
Dept: FAMILY MEDICINE CLINIC | Age: 44
End: 2019-04-22
Payer: COMMERCIAL

## 2019-04-22 VITALS
HEART RATE: 84 BPM | DIASTOLIC BLOOD PRESSURE: 67 MMHG | SYSTOLIC BLOOD PRESSURE: 129 MMHG | BODY MASS INDEX: 54.86 KG/M2 | OXYGEN SATURATION: 97 % | TEMPERATURE: 99.2 F | HEIGHT: 61 IN | WEIGHT: 290.6 LBS

## 2019-04-22 DIAGNOSIS — J98.01 POST-INFECTION BRONCHOSPASM: Primary | ICD-10-CM

## 2019-04-22 PROCEDURE — 99213 OFFICE O/P EST LOW 20 MIN: CPT | Performed by: NURSE PRACTITIONER

## 2019-04-22 ASSESSMENT — ENCOUNTER SYMPTOMS
SORE THROAT: 0
COUGH: 1
SHORTNESS OF BREATH: 0
WHEEZING: 1
RHINORRHEA: 0
CHEST TIGHTNESS: 0
TROUBLE SWALLOWING: 0

## 2019-04-22 NOTE — PROGRESS NOTES
[address] [facility] Wilner [address]. [address]  Dept: [phone]  Dept Fax: [phone]  Loc: [facility]       Chief Complaint   Patient presents with    Cough     green     Wheezing       Nurses Notes reviewed and I agree except as noted in the HPI. HISTORY OF PRESENT ILLNESS   Criss Solis is a 37 y.o. female who presents with c/o cough. She notes an intermittent productive cough with green sputum. Associated intermittent wheezing and chest congestion. Denies fever, chest pain, or SOB. No worsening leg swelling from last visit. Still waiting on ECHO to get scheduled. Wheezing controlled with inhaler. \"I just want to make sure I'm better. \"    No additional complaints. REVIEW OF SYSTEMS     Review of Systems   Constitutional: Negative for chills, diaphoresis, fatigue and fever. HENT: Negative for congestion, ear pain, rhinorrhea, sore throat and trouble swallowing. Respiratory: Positive for cough and wheezing. Negative for chest tightness and shortness of breath. Cardiovascular: Positive for leg swelling (chronic, no worsening). Negative for chest pain and palpitations. Musculoskeletal: Negative for neck pain and neck stiffness. Skin: Negative for rash. Neurological: Negative for dizziness, light-headedness and headaches. Hematological: Negative for adenopathy. PAST MEDICAL HISTORY         Diagnosis Date    Anxiety     treated with tegretol in past    Heart murmur     Hypertension     Hypothyroidism 3/13/2015    Obesity due to excess calories 10/25/2016    PPD positive        SURGICAL HISTORY     Patient  has a past surgical history that includes ovarian cyst removal (2011); LEEP (3/27/2015); Thyroidectomy, Completion (12/2015); and Thyroidectomy (12/31/2014).     CURRENT MEDICATIONS       Outpatient Medications Prior to Visit   Medication Sig Dispense Refill  Ferrous Sulfate (IRON) 325 (65 Fe) MG TABS Take 325 mg by mouth daily 30 tablet 0    albuterol sulfate HFA (PROAIR HFA) 108 (90 Base) MCG/ACT inhaler Inhale 2 puffs into the lungs every 4 hours as needed for Wheezing or Shortness of Breath 1 Inhaler 0    promethazine-dextromethorphan (PROMETHAZINE-DM) 6.25-15 MG/5ML syrup Take 5 mLs by mouth 4 times daily as needed for Cough 120 mL 0    potassium chloride (K-TAB) 20 MEQ TBCR extended release tablet Take 1 tablet by mouth daily (with breakfast) 30 tablet 0    metolazone (ZAROXOLYN) 5 MG tablet Take 1 tablet by mouth daily 90 tablet 1    losartan (COZAAR) 100 MG tablet Take 1 tablet by mouth daily 30 tablet 3    hydrochlorothiazide (HYDRODIURIL) 25 MG tablet Take 1 tablet by mouth daily 30 tablet 3    vitamin D-3 (CHOLECALCIFEROL) 5000 units TABS Take 1 tablet by mouth daily 30 tablet 3    Elastic Bandages & Supports (MEDICAL COMPRESSION STOCKINGS) MISC 1 each by Does not apply route daily as needed (leg swelling) 1 each 0    SYNTHROID 175 MCG tablet TAKE 1 TABLET BY MOUTH DAILY 90 tablet 1     No facility-administered medications prior to visit. ALLERGIES     Patient is has No Known Allergies. FAMILY HISTORY     Patient's family history includes COPD in her mother; Cancer in her maternal uncle; Diabetes in her brother, maternal grandmother, and mother; Emphysema in her mother; Heart Disease in her mother; High Blood Pressure in her maternal grandmother and mother; No Known Problems in her sister; Other in her maternal grandfather and mother; Thyroid Disease in her mother. SOCIAL HISTORY     Patient  reports that she has never smoked. She has never used smokeless tobacco. She reports that she does not drink alcohol or use drugs. PHYSICAL EXAM     VITALS  BP: 129/67, Temp: 99.2 °F (37.3 °C), Pulse: 84,  , SpO2: 97 %  Physical Exam   Constitutional: She is oriented to person, place, and time.  Vital signs are normal. She appears well-developed and well-nourished. She is cooperative. Non-toxic appearance. She does not have a sickly appearance. She does not appear ill. No distress. HENT:   Head: Normocephalic and atraumatic. Right Ear: Hearing, tympanic membrane, external ear and ear canal normal.   Left Ear: Hearing, tympanic membrane, external ear and ear canal normal.   Nose: Nose normal. No mucosal edema or rhinorrhea. Right sinus exhibits no maxillary sinus tenderness and no frontal sinus tenderness. Left sinus exhibits no maxillary sinus tenderness and no frontal sinus tenderness. Mouth/Throat: Uvula is midline, oropharynx is clear and moist and mucous membranes are normal. No trismus in the jaw. No uvula swelling. No oropharyngeal exudate, posterior oropharyngeal edema, posterior oropharyngeal erythema or tonsillar abscesses. Eyes: Right conjunctiva is not injected. Right conjunctiva has no hemorrhage. Left conjunctiva is not injected. Left conjunctiva has no hemorrhage. No scleral icterus. Neck: Trachea normal and normal range of motion. Neck supple. No thyromegaly present. Cardiovascular: Normal rate, regular rhythm and normal heart sounds. No extrasystoles are present. PMI is not displaced. Exam reveals no gallop and no friction rub. No murmur heard. Pulmonary/Chest: Effort normal and breath sounds normal. No respiratory distress. Lymphadenopathy:        Head (right side): No submental, no submandibular, no tonsillar, no preauricular, no posterior auricular and no occipital adenopathy present. Head (left side): No submental, no submandibular, no tonsillar, no preauricular, no posterior auricular and no occipital adenopathy present. She has no cervical adenopathy. Right: No supraclavicular adenopathy present. Left: No supraclavicular adenopathy present. Neurological: She is alert and oriented to person, place, and time. She is not disoriented. Skin: Skin is warm, dry and intact.  No rash noted. She is not diaphoretic. No pallor. Skin warm and dry to touch, no rashes noted on exposed surfaces. Nursing note and vitals reviewed. DIAGNOSTIC RESULTS   Labs:No results found for this visit on 04/22/19. IMAGING:  No orders to display       No images are attached to the encounter or orders placed in the encounter. CLINICAL COURSE COURSE:     Vitals:    04/22/19 1047   BP: 129/67   Site: Right Upper Arm   Position: Sitting   Cuff Size: Large Adult   Pulse: 84   Temp: 99.2 °F (37.3 °C)   TempSrc: Oral   SpO2: 97%   Weight: 290 lb 9.6 oz (131.8 kg)   Height: 5' 1.42\" (1.56 m)         PROCEDURES:  None  FINAL IMPRESSION      1. Post-infection bronchospasm         DISPOSITION/PLAN     Exam c/w post infection bronchospasm. No adventitious lung sounds. Continue inhaler prn wheezing. Mucinex prn chest congestion. PATIENT REFERRED TO:    Follow up with PCP as scheduled, sooner if needed.     DISCHARGE MEDICATIONS:  New Prescriptions    No medications on file         Electronically signed by BRETT Rankin CNP on 4/22/2019 at 12:25 PM

## 2019-04-22 NOTE — PROGRESS NOTES
Health Maintenance Due   Topic Date Due    HIV screen  11/22/1990    DTaP/Tdap/Td vaccine (1 - Tdap) 11/22/1994 pt will bring records

## 2019-04-22 NOTE — PATIENT INSTRUCTIONS
Patient Education      Continue current medications. Mucinex over the counter and fluids. Follow up as scheduled, sooner if needed. Bronchitis: Care Instructions  Your Care Instructions    Bronchitis is inflammation of the bronchial tubes, which carry air to the lungs. The tubes swell and produce mucus, or phlegm. The mucus and inflamed bronchial tubes make you cough. You may have trouble breathing. Most cases of bronchitis are caused by viruses like those that cause colds. Antibiotics usually do not help and they may be harmful. Bronchitis usually develops rapidly and lasts about 2 to 3 weeks in otherwise healthy people. Follow-up care is a key part of your treatment and safety. Be sure to make and go to all appointments, and call your doctor if you are having problems. It's also a good idea to know your test results and keep a list of the medicines you take. How can you care for yourself at home? · Take all medicines exactly as prescribed. Call your doctor if you think you are having a problem with your medicine. · Get some extra rest.  · Take an over-the-counter pain medicine, such as acetaminophen (Tylenol), ibuprofen (Advil, Motrin), or naproxen (Aleve) to reduce fever and relieve body aches. Read and follow all instructions on the label. · Do not take two or more pain medicines at the same time unless the doctor told you to. Many pain medicines have acetaminophen, which is Tylenol. Too much acetaminophen (Tylenol) can be harmful. · Take an over-the-counter cough medicine that contains dextromethorphan to help quiet a dry, hacking cough so that you can sleep. Avoid cough medicines that have more than one active ingredient. Read and follow all instructions on the label. · Breathe moist air from a humidifier, hot shower, or sink filled with hot water. The heat and moisture will thin mucus so you can cough it out. · Do not smoke. Smoking can make bronchitis worse.  If you need help quitting, talk to your doctor about stop-smoking programs and medicines. These can increase your chances of quitting for good. When should you call for help? Call 911 anytime you think you may need emergency care. For example, call if:    · You have severe trouble breathing.    Call your doctor now or seek immediate medical care if:    · You have new or worse trouble breathing.     · You cough up dark brown or bloody mucus (sputum).     · You have a new or higher fever.     · You have a new rash.    Watch closely for changes in your health, and be sure to contact your doctor if:    · You cough more deeply or more often, especially if you notice more mucus or a change in the color of your mucus.     · You are not getting better as expected. Where can you learn more? Go to https://[employer]. org and sign in to your [employer] account. Enter H333 in the [employer] box to learn more about \"Bronchitis: Care Instructions. \"     If you do not have an account, please click on the \"Sign Up Now\" link. Current as of: September 5, 2018  Content Version: 11.9  © 3765-7720 [employer]. Care instructions adapted under license by [facility] ([facility]). If you have questions about a medical condition or this instruction, always ask your healthcare professional. [address] any warranty or liability for your use of this information.

## 2019-04-22 NOTE — LETTER
[address] [address],[address] [address] [facility]  Phone: [phone]  Fax: [phone] BRETT Todd CNP        April 22, 2019     Patient: Sylvie Dubin   YOB: 1975   Date of Visit: 4/22/2019       To Whom it May Concern:    Katie Parker was seen in my clinic on 4/22/2019. If you have any questions or concerns, please don't hesitate to call.     Sincerely,         BRETT Trinidad CNP

## 2019-04-29 RX ORDER — HYDROCHLOROTHIAZIDE 25 MG/1
25 TABLET ORAL DAILY
Qty: 90 TABLET | Refills: 1 | Status: SHIPPED | OUTPATIENT
Start: 2019-04-29 | End: 2019-05-10 | Stop reason: SDUPTHER

## 2019-04-29 RX ORDER — LOSARTAN POTASSIUM 100 MG/1
100 TABLET ORAL DAILY
Qty: 90 TABLET | Refills: 1 | Status: SHIPPED | OUTPATIENT
Start: 2019-04-29 | End: 2019-05-10 | Stop reason: SDUPTHER

## 2019-04-29 RX ORDER — METOLAZONE 5 MG/1
5 TABLET ORAL DAILY
Qty: 90 TABLET | Refills: 1 | Status: SHIPPED | OUTPATIENT
Start: 2019-04-29 | End: 2019-05-10 | Stop reason: SDUPTHER

## 2019-04-29 NOTE — TELEPHONE ENCOUNTER
Last visit- 4/22/2019  Next visit- 5/10/2019    Requested Prescriptions     Pending Prescriptions Disp Refills    hydrochlorothiazide (HYDRODIURIL) 25 MG tablet 90 tablet 1     Sig: Take 1 tablet by mouth daily    losartan (COZAAR) 100 MG tablet 90 tablet 1     Sig: Take 1 tablet by mouth daily    metolazone (ZAROXOLYN) 5 MG tablet 90 tablet 1     Sig: Take 1 tablet by mouth daily    vitamin D-3 (CHOLECALCIFEROL) 5000 units TABS 90 tablet 1     Sig: Take 1 tablet by mouth daily       90 DAY SUPPLY REQUESTED.

## 2019-05-03 ENCOUNTER — TELEPHONE (OUTPATIENT)
Dept: FAMILY MEDICINE CLINIC | Age: 44
End: 2019-05-03

## 2019-05-03 DIAGNOSIS — R05.9 COUGH: Primary | ICD-10-CM

## 2019-05-03 RX ORDER — BENZONATATE 100 MG/1
100 CAPSULE ORAL 2 TIMES DAILY PRN
Qty: 20 CAPSULE | Refills: 0 | Status: SHIPPED | OUTPATIENT
Start: 2019-05-03 | End: 2019-05-10 | Stop reason: ALTCHOICE

## 2019-05-03 NOTE — TELEPHONE ENCOUNTER
Pt called stating that cough is not any better. Offered her an appointment and she can't make it in due to her work schedule. She is wondering if she could get something else for the cough. Please advise.

## 2019-05-10 ENCOUNTER — HOSPITAL ENCOUNTER (OUTPATIENT)
Dept: NON INVASIVE DIAGNOSTICS | Age: 44
Discharge: HOME | End: 2019-05-10
Payer: COMMERCIAL

## 2019-05-10 ENCOUNTER — OFFICE VISIT (OUTPATIENT)
Dept: FAMILY MEDICINE CLINIC | Age: 44
End: 2019-05-10
Payer: COMMERCIAL

## 2019-05-10 VITALS
HEIGHT: 61 IN | WEIGHT: 293 LBS | HEART RATE: 84 BPM | OXYGEN SATURATION: 99 % | DIASTOLIC BLOOD PRESSURE: 73 MMHG | RESPIRATION RATE: 16 BRPM | SYSTOLIC BLOOD PRESSURE: 101 MMHG | BODY MASS INDEX: 55.32 KG/M2 | TEMPERATURE: 98.1 F

## 2019-05-10 DIAGNOSIS — M79.89 LEG SWELLING: ICD-10-CM

## 2019-05-10 DIAGNOSIS — R60.0 BILATERAL LEG EDEMA: ICD-10-CM

## 2019-05-10 DIAGNOSIS — E87.6 HYPOKALEMIA: Primary | ICD-10-CM

## 2019-05-10 DIAGNOSIS — I10 ESSENTIAL HYPERTENSION: ICD-10-CM

## 2019-05-10 DIAGNOSIS — E89.0 POSTOPERATIVE HYPOTHYROIDISM: ICD-10-CM

## 2019-05-10 DIAGNOSIS — D64.9 ANEMIA, UNSPECIFIED TYPE: ICD-10-CM

## 2019-05-10 LAB
LV EF: 60 %
LVEF MODALITY: NORMAL

## 2019-05-10 PROCEDURE — 99214 OFFICE O/P EST MOD 30 MIN: CPT | Performed by: NURSE PRACTITIONER

## 2019-05-10 PROCEDURE — 93306 TTE W/DOPPLER COMPLETE: CPT

## 2019-05-10 RX ORDER — HYDROCHLOROTHIAZIDE 25 MG/1
25 TABLET ORAL DAILY
Qty: 90 TABLET | Refills: 1 | Status: SHIPPED | OUTPATIENT
Start: 2019-05-10

## 2019-05-10 RX ORDER — METOLAZONE 5 MG/1
5 TABLET ORAL DAILY
Qty: 90 TABLET | Refills: 1 | Status: SHIPPED | OUTPATIENT
Start: 2019-05-10 | End: 2021-04-07 | Stop reason: ALTCHOICE

## 2019-05-10 RX ORDER — POTASSIUM CHLORIDE 1500 MG/1
20 TABLET, FILM COATED, EXTENDED RELEASE ORAL
Qty: 30 TABLET | Refills: 0 | Status: SHIPPED | OUTPATIENT
Start: 2019-05-10 | End: 2021-04-07 | Stop reason: ALTCHOICE

## 2019-05-10 RX ORDER — PNV NO.95/FERROUS FUM/FOLIC AC 28MG-0.8MG
325 TABLET ORAL DAILY
Qty: 30 TABLET | Refills: 0 | Status: SHIPPED | OUTPATIENT
Start: 2019-05-10 | End: 2022-06-02

## 2019-05-10 RX ORDER — LOSARTAN POTASSIUM 100 MG/1
100 TABLET ORAL DAILY
Qty: 90 TABLET | Refills: 1 | Status: SHIPPED | OUTPATIENT
Start: 2019-05-10 | End: 2022-06-02

## 2019-05-10 RX ORDER — LEVOTHYROXINE SODIUM 175 MCG
TABLET ORAL
Qty: 90 TABLET | Refills: 1 | Status: SHIPPED | OUTPATIENT
Start: 2019-05-10

## 2019-05-10 ASSESSMENT — ENCOUNTER SYMPTOMS
EYE DISCHARGE: 0
SHORTNESS OF BREATH: 0
EYE REDNESS: 0
ANAL BLEEDING: 0
ABDOMINAL DISTENTION: 0
DIARRHEA: 0
RHINORRHEA: 0
COLOR CHANGE: 0
BLOOD IN STOOL: 0
COUGH: 1
CONSTIPATION: 0
ABDOMINAL PAIN: 0
NAUSEA: 0
SORE THROAT: 0

## 2019-05-10 NOTE — PROGRESS NOTES
[address] [facility] [address]  [address]  Dept: [phone]  Dept Fax: [phone]: [phone]    Visit Date: 5/10/2019    Milton Jordan is a 37 y.o. female who presents today for:  Chief Complaint   Patient presents with    1 Month Follow-Up    Leg Swelling    Discuss Labs     HPI:     Having the ECHO done today around 4 - leg swelling is about the same as before. Normally takes the HCTZ in the mornings - hasn't had it today. The compression stockings made her hot - tried them for a few days and she did not see much difference in the swelling    Swelling is a little better today - she did not work yesterday. Her job requires her to sit 50-60 hours per week and this is contributing to the swelling. Towards the end of the weekend the leg tightness and swelling decreased but by Monday at work the swelling is back. She feels like sitting all day long is negatively impacting her health     She does have a BP cuff at home - does not check it at home. She was on Cozaar 50 mg but she was stressed at school and her BP was high - her previous dr increased it to 100 mg. BP has been running a little on the lower side.      Has been taking the potassium daily and iron    stil coughing - never filled Tessalon - feels like air triggers her cough    HPI  Health Maintenance   Topic Date Due    HIV screen  11/22/1990    DTaP/Tdap/Td vaccine (1 - Tdap) 11/22/1994    Flu vaccine (Season Ended) 09/01/2019    Cervical cancer screen  02/02/2020    TSH testing  04/08/2020    Potassium monitoring  04/08/2020    Creatinine monitoring  04/08/2020    Lipid screen  04/08/2024    Pneumococcal 0-64 years Vaccine  Aged Out       Past Medical History:   Diagnosis Date    Anxiety     treated with tegretol in past    Heart murmur     Hypertension     Hypothyroidism 3/13/2015    Obesity due to excess calories 10/25/2016    PPD positive       Past Surgical History:   Procedure Laterality Date    LEEP  3/27/2015    OVARIAN CYST REMOVAL  2011    THYROIDECTOMY  12/31/2014    THYROIDECTOMY, COMPLETION  12/2015     Family History   Problem Relation Age of Onset    Diabetes Mother     Thyroid Disease Mother     High Blood Pressure Mother     Emphysema Mother     Other Mother     COPD Mother     Heart Disease Mother         ENLARGE HEART    Diabetes Brother     Diabetes Maternal Grandmother     High Blood Pressure Maternal Grandmother     Other Maternal Grandfather         COPD    Cancer Maternal Uncle     No Known Problems Sister      Social History     Tobacco Use    Smoking status: Never Smoker    Smokeless tobacco: Never Used   Substance Use Topics    Alcohol use: No     Alcohol/week: 0.0 oz      Current Outpatient Medications   Medication Sig Dispense Refill    hydrochlorothiazide (HYDRODIURIL) 25 MG tablet Take 1 tablet by mouth daily 90 tablet 1    losartan (COZAAR) 100 MG tablet Take 1 tablet by mouth daily 90 tablet 1    metolazone (ZAROXOLYN) 5 MG tablet Take 1 tablet by mouth daily 90 tablet 1    potassium chloride (K-TAB) 20 MEQ TBCR extended release tablet Take 1 tablet by mouth daily (with breakfast) 30 tablet 0    Ferrous Sulfate (IRON) 325 (65 Fe) MG TABS Take 325 mg by mouth daily 30 tablet 0    vitamin D-3 (CHOLECALCIFEROL) 5000 units TABS Take 1 tablet by mouth daily 90 tablet 1    SYNTHROID 175 MCG tablet TAKE 1 TABLET BY MOUTH DAILY 90 tablet 1    albuterol sulfate HFA (PROAIR HFA) 108 (90 Base) MCG/ACT inhaler Inhale 2 puffs into the lungs every 4 hours as needed for Wheezing or Shortness of Breath 1 Inhaler 0    Elastic Bandages & Supports (MEDICAL COMPRESSION STOCKINGS) MISC 1 each by Does not apply route daily as needed (leg swelling) 1 each 0     No current facility-administered medications for this visit.       No Known Allergies    Subjective:    Review of Systems   Constitutional: Negative for chills, fatigue and fever. HENT: Negative for congestion, ear pain, postnasal drip, rhinorrhea and sore throat. Eyes: Negative for discharge and redness. Respiratory: Positive for cough. Negative for shortness of breath. Cardiovascular: Positive for leg swelling. Negative for chest pain. Gastrointestinal: Negative for abdominal distention, abdominal pain, anal bleeding, blood in stool, constipation, diarrhea and nausea. Skin: Negative for color change and rash. Neurological: Negative for facial asymmetry, speech difficulty and weakness. Hematological: Does not bruise/bleed easily. Psychiatric/Behavioral: Negative for agitation and confusion. Objective:      Vitals:    05/10/19 0936   BP: 101/73   Site: Left Upper Arm   Position: Sitting   Cuff Size: Large Adult   Pulse: 84   Resp: 16   Temp: 98.1 °F (36.7 °C)   TempSrc: Oral   SpO2: 99%   Weight: 293 lb (132.9 kg)   Height: 5' 1.42\" (1.56 m)       Body mass index is 54.61 kg/m². Wt Readings from Last 3 Encounters:   05/10/19 293 lb (132.9 kg)   04/22/19 290 lb 9.6 oz (131.8 kg)   04/15/19 293 lb 3.2 oz (133 kg)     BP Readings from Last 3 Encounters:   05/10/19 101/73   04/22/19 129/67   04/15/19 133/65       Physical Exam   Constitutional: Vital signs are normal. She appears well-developed and well-nourished. She does not appear ill. No distress. HENT:   Head: Normocephalic and atraumatic. Right Ear: Hearing and external ear normal. No decreased hearing is noted. Left Ear: Hearing and external ear normal. No decreased hearing is noted. Nose: Nose normal. No nasal deformity. Eyes: Conjunctivae are normal. Right eye exhibits no discharge. Left eye exhibits no discharge. Neck: Normal range of motion. Neck supple. Cardiovascular: Normal rate and regular rhythm. Bilateral lower leg edema   Pulmonary/Chest: Effort normal. No respiratory distress. She has no wheezes. Abdominal: She exhibits no distension.  There is no guarding. Musculoskeletal: Normal range of motion. She exhibits no tenderness or deformity. Right lower leg: She exhibits edema. Left lower leg: She exhibits edema. Right foot: There is swelling. Left foot: There is swelling. Neurological: She is alert. Gait normal.   Skin: No rash (On exposed areas) noted. She is not diaphoretic. No erythema. No pallor. Psychiatric: She has a normal mood and affect. Her speech is normal and behavior is normal. Judgment and thought content normal. Cognition and memory are normal.       Lab Results   Component Value Date    WBC 4.4 (L) 04/08/2019    HGB 11.3 (L) 04/08/2019    HCT 34.5 (L) 04/08/2019     04/08/2019    CHOL 155 04/08/2019    TRIG 84 04/08/2019    HDL 45 04/08/2019    LDLCALC 93 04/08/2019    AST 17 04/08/2019     04/08/2019    K 2.9 (L) 04/08/2019    CL 98 04/08/2019    CREATININE 0.7 04/08/2019    BUN 15 04/08/2019    CO2 29 04/08/2019    TSH 0.684 04/08/2019    LABA1C 5.4 01/08/2018    LABGLOM >90 04/08/2019    MG 2.0 07/23/2014    CALCIUM 9.1 04/08/2019    VITD25 33 04/08/2019       Assessment:       Diagnosis Orders   1. Hypokalemia  Potassium    potassium chloride (K-TAB) 20 MEQ TBCR extended release tablet   2. Bilateral leg edema  hydrochlorothiazide (HYDRODIURIL) 25 MG tablet    metolazone (ZAROXOLYN) 5 MG tablet   3. Anemia, unspecified type  CBC Auto Differential    Iron and TIBC    Ferrous Sulfate (IRON) 325 (65 Fe) MG TABS   4. Essential hypertension  losartan (COZAAR) 100 MG tablet   5. Postoperative hypothyroidism  SYNTHROID 175 MCG tablet       Plan:   Get the ECHO today  Can decrease the Cozaar to 50 mg - can take take half a tablet   · Check you blood pressure at least 2 times daily.  Check while sitting down, feet flat on the ground, and arm at the level of your heart  · Please let us know if you blood pressure readings are 140/90 or higher  · Avoid all salt in the diet  · Drink plenty of water - half of Your body weight in ounces daily  · Exercise - Aim for 30 minutes daily of aerobic exercise. Can run, jog, walk, swim, weight lift - anything to get the heart rate elevated. Will recheck the CBC with iron and potassium  Will refill medications  Will see you back in about 3 months, sooner as needed    No follow-ups on file. Orders Placed:  Orders Placed This Encounter   Procedures    Potassium    CBC Auto Differential    Iron and TIBC     Medications Prescribed:  Orders Placed This Encounter   Medications    hydrochlorothiazide (HYDRODIURIL) 25 MG tablet     Sig: Take 1 tablet by mouth daily     Dispense:  90 tablet     Refill:  1    losartan (COZAAR) 100 MG tablet     Sig: Take 1 tablet by mouth daily     Dispense:  90 tablet     Refill:  1    metolazone (ZAROXOLYN) 5 MG tablet     Sig: Take 1 tablet by mouth daily     Dispense:  90 tablet     Refill:  1    potassium chloride (K-TAB) 20 MEQ TBCR extended release tablet     Sig: Take 1 tablet by mouth daily (with breakfast)     Dispense:  30 tablet     Refill:  0    Ferrous Sulfate (IRON) 325 (65 Fe) MG TABS     Sig: Take 325 mg by mouth daily     Dispense:  30 tablet     Refill:  0    vitamin D-3 (CHOLECALCIFEROL) 5000 units TABS     Sig: Take 1 tablet by mouth daily     Dispense:  90 tablet     Refill:  1    SYNTHROID 175 MCG tablet     Sig: TAKE 1 TABLET BY MOUTH DAILY     Dispense:  90 tablet     Refill:  1       Future Appointments   Date Time Provider Aris Mcneil   5/10/2019  4:00 PM STR ECHO RM1 STRZ ECHO None   8/12/2019  9:00 AM Sonal Sigala, APRN - CNP [facility] [address]        Patient given educational materials - see patient instructions. Discussed use, benefit, and side effects of prescribedmedications. All patient questions answered. Pt voiced understanding. Reviewed health maintenance. Instructed to continue current medications, diet and exercise. Patient agreed with treatment plan.  Follow up as directed.     Electronically signed by BRETT Stevens CNP on 5/10/2019 at 11:26 AM

## 2019-05-10 NOTE — LETTER
[address] [place] [address],[address] [address] [facility]  Phone: [phone]  Fax: [phone] [facility] BRETT Arriaza CNP        May 10, 2019     Patient: Clau Rodriguez   YOB: 1975   Date of Visit: 5/10/2019       To Whom It May Concern: It is my medical opinion that Omero Carter may return to work on 05/13/2019. If you have any questions or concerns, please don't hesitate to call.     Sincerely,        BRETT Romano CNP

## 2019-05-10 NOTE — PATIENT INSTRUCTIONS
Get the ECHO today  Can decrease the Cozaar to 50 mg - can take take half a tablet   · Check you blood pressure at least 2 times daily. Check while sitting down, feet flat on the ground, and arm at the level of your heart  · Please let us know if you blood pressure readings are 140/90 or higher  · Avoid all salt in the diet  · Drink plenty of water - half of  Your body weight in ounces daily  · Exercise - Aim for 30 minutes daily of aerobic exercise. Can run, jog, walk, swim, weight lift - anything to get the heart rate elevated. Will recheck the CBC with iron and potassium  Will refill medications  Will see you back in about 3 months, sooner as needed      Patient Education        DASH Diet: Care Instructions  Your Care Instructions    The DASH diet is an eating plan that can help lower your blood pressure. DASH stands for Dietary Approaches to Stop Hypertension. Hypertension is high blood pressure. The DASH diet focuses on eating foods that are high in calcium, potassium, and magnesium. These nutrients can lower blood pressure. The foods that are highest in these nutrients are fruits, vegetables, low-fat dairy products, nuts, seeds, and legumes. But taking calcium, potassium, and magnesium supplements instead of eating foods that are high in those nutrients does not have the same effect. The DASH diet also includes whole grains, fish, and poultry. The DASH diet is one of several lifestyle changes your doctor may recommend to lower your high blood pressure. Your doctor may also want you to decrease the amount of sodium in your diet. Lowering sodium while following the DASH diet can lower blood pressure even further than just the DASH diet alone. Follow-up care is a key part of your treatment and safety. Be sure to make and go to all appointments, and call your doctor if you are having problems. It's also a good idea to know your test results and keep a list of the medicines you take.   How can you care for yourself at home? Following the DASH diet  · Eat 4 to 5 servings of fruit each day. A serving is 1 medium-sized piece of fruit, ½ cup chopped or canned fruit, 1/4 cup dried fruit, or 4 ounces (½ cup) of fruit juice. Choose fruit more often than fruit juice. · Eat 4 to 5 servings of vegetables each day. A serving is 1 cup of lettuce or raw leafy vegetables, ½ cup of chopped or cooked vegetables, or 4 ounces (½ cup) of vegetable juice. Choose vegetables more often than vegetable juice. · Get 2 to 3 servings of low-fat and fat-free dairy each day. A serving is 8 ounces of milk, 1 cup of yogurt, or 1 ½ ounces of cheese. · Eat 6 to 8 servings of grains each day. A serving is 1 slice of bread, 1 ounce of dry cereal, or ½ cup of cooked rice, pasta, or cooked cereal. Try to choose whole-grain products as much as possible. · Limit lean meat, poultry, and fish to 2 servings each day. A serving is 3 ounces, about the size of a deck of cards. · Eat 4 to 5 servings of nuts, seeds, and legumes (cooked dried beans, lentils, and split peas) each week. A serving is 1/3 cup of nuts, 2 tablespoons of seeds, or ½ cup of cooked beans or peas. · Limit fats and oils to 2 to 3 servings each day. A serving is 1 teaspoon of vegetable oil or 2 tablespoons of salad dressing. · Limit sweets and added sugars to 5 servings or less a week. A serving is 1 tablespoon jelly or jam, ½ cup sorbet, or 1 cup of lemonade. · Eat less than 2,300 milligrams (mg) of sodium a day. If you limit your sodium to 1,500 mg a day, you can lower your blood pressure even more. Tips for success  · Start small. Do not try to make dramatic changes to your diet all at once. You might feel that you are missing out on your favorite foods and then be more likely to not follow the plan. Make small changes, and stick with them. Once those changes become habit, add a few more changes. · Try some of the following:  ?  Make it a goal to eat a fruit or vegetable at every meal and at snacks. This will make it easy to get the recommended amount of fruits and vegetables each day. ? Try yogurt topped with fruit and nuts for a snack or healthy dessert. ? Add lettuce, tomato, cucumber, and onion to sandwiches. ? Combine a ready-made pizza crust with low-fat mozzarella cheese and lots of vegetable toppings. Try using tomatoes, squash, spinach, broccoli, carrots, cauliflower, and onions. ? Have a variety of cut-up vegetables with a low-fat dip as an appetizer instead of chips and dip. ? Sprinkle sunflower seeds or chopped almonds over salads. Or try adding chopped walnuts or almonds to cooked vegetables. ? Try some vegetarian meals using beans and peas. Add garbanzo or kidney beans to salads. Make burritos and tacos with mashed hewitt beans or black beans. Where can you learn more? Go to https://[email or web address].mobi.[facility]. org and sign in to your Worlize account. Enter J099 in the [facility] box to learn more about \"DASH Diet: Care Instructions. \"     If you do not have an account, please click on the \"Sign Up Now\" link. Current as of: July 22, 2018  Content Version: 12.0  © 6759-6475 Gewara. Care instructions adapted under license by [facility]. If you have questions about a medical condition or this instruction, always ask your healthcare professional. [place] [address] any warranty or liability for your use of this information. Patient Education        Leg and Ankle Edema: Care Instructions  Your Care Instructions  Swelling in the legs, ankles, and feet is called edema. It is common after you sit or stand for a while. Long plane flights or car rides often cause swelling in the legs and feet. You may also have swelling if you have to stand for long periods of time at your job. Problems with the veins in the legs (varicose veins) and changes in hormones can also cause swelling.  Sometimes the swelling in the ankles and feet is caused by a more serious problem, such as heart failure, infection, blood clots, or liver or kidney disease. Follow-up care is a key part of your treatment and safety. Be sure to make and go to all appointments, and call your doctor if you are having problems. It's also a good idea to know your test results and keep a list of the medicines you take. How can you care for yourself at home? · If your doctor gave you medicine, take it as prescribed. Call your doctor if you think you are having a problem with your medicine. · Whenever you are resting, raise your legs up. Try to keep the swollen area higher than the level of your heart. · Take breaks from standing or sitting in one position. ? Walk around to increase the blood flow in your lower legs. ? Move your feet and ankles often while you stand, or tighten and relax your leg muscles. · Wear support stockings. Put them on in the morning, before swelling gets worse. · Eat a balanced diet. Lose weight if you need to. · Limit the amount of salt (sodium) in your diet. Salt holds fluid in the body and may increase swelling. When should you call for help? Call 911 anytime you think you may need emergency care. For example, call if:    · You have symptoms of a blood clot in your lung (called a pulmonary embolism). These may include:  ? Sudden chest pain. ? Trouble breathing. ? Coughing up blood.    Call your doctor now or seek immediate medical care if:    · You have signs of a blood clot, such as:  ? Pain in your calf, back of the knee, thigh, or groin. ? Redness and swelling in your leg or groin.     · You have symptoms of infection, such as:  ? Increased pain, swelling, warmth, or redness. ? Red streaks or pus. ? A fever.    Watch closely for changes in your health, and be sure to contact your doctor if:    · Your swelling is getting worse.     · You have new or worsening pain in your legs.     · You do not get better as expected.    Where can you learn more? Go to https://chpepiceweb.Tilt. org and sign in to your eIQ Energy account. Enter V289 in the [facility] box to learn more about \"Leg and Ankle Edema: Care Instructions. \"     If you do not have an account, please click on the \"Sign Up Now\" link. Current as of: September 23, 2018  Content Version: 12.0  © 3984-3671 [employer] Development. Care instructions adapted under license by [facility] ([facility]). If you have questions about a medical condition or this instruction, always ask your healthcare professional. [place] [address] any warranty or liability for your use of this information. Patient Education        High Blood Pressure: Care Instructions  Overview    It's normal for blood pressure to go up and down throughout the day. But if it stays up, you have high blood pressure. Another name for high blood pressure is hypertension. Despite what a lot of people think, high blood pressure usually doesn't cause headaches or make you feel dizzy or lightheaded. It usually has no symptoms. But it does increase your risk of stroke, heart attack, and other problems. You and your doctor will talk about your risks of these problems based on your blood pressure. Your doctor will give you a goal for your blood pressure. Your goal will be based on your health and your age. Lifestyle changes, such as eating healthy and being active, are always important to help lower blood pressure. You might also take medicine to reach your blood pressure goal.  Follow-up care is a key part of your treatment and safety. Be sure to make and go to all appointments, and call your doctor if you are having problems. It's also a good idea to know your test results and keep a list of the medicines you take. How can you care for yourself at home? Medical treatment  · If you stop taking your medicine, your blood pressure will go back up.  You may take one or more types of medicine to lower your blood pressure. Be safe with medicines. Take your medicine exactly as prescribed. Call your doctor if you think you are having a problem with your medicine. · Talk to your doctor before you start taking aspirin every day. Aspirin can help certain people lower their risk of a heart attack or stroke. But taking aspirin isn't right for everyone, because it can cause serious bleeding. · See your doctor regularly. You may need to see the doctor more often at first or until your blood pressure comes down. · If you are taking blood pressure medicine, talk to your doctor before you take decongestants or anti-inflammatory medicine, such as ibuprofen. Some of these medicines can raise blood pressure. · Learn how to check your blood pressure at home. Lifestyle changes  · Stay at a healthy weight. This is especially important if you put on weight around the waist. Losing even 10 pounds can help you lower your blood pressure. · If your doctor recommends it, get more exercise. Walking is a good choice. Bit by bit, increase the amount you walk every day. Try for at least 30 minutes on most days of the week. You also may want to swim, bike, or do other activities. · Avoid or limit alcohol. Talk to your doctor about whether you can drink any alcohol. · Try to limit how much sodium you eat to less than 2,300 milligrams (mg) a day. Your doctor may ask you to try to eat less than 1,500 mg a day. · Eat plenty of fruits (such as bananas and oranges), vegetables, legumes, whole grains, and low-fat dairy products. · Lower the amount of saturated fat in your diet. Saturated fat is found in animal products such as milk, cheese, and meat. Limiting these foods may help you lose weight and also lower your risk for heart disease. · Do not smoke. Smoking increases your risk for heart attack and stroke. If you need help quitting, talk to your doctor about stop-smoking programs and medicines.  These can increase your chances of quitting for good. When should you call for help? Call 911 anytime you think you may need emergency care. This may mean having symptoms that suggest that your blood pressure is causing a serious heart or blood vessel problem. Your blood pressure may be over 180/120.   For example, call 911 if:    · You have symptoms of a heart attack. These may include:  ? Chest pain or pressure, or a strange feeling in the chest.  ? Sweating. ? Shortness of breath. ? Nausea or vomiting. ? Pain, pressure, or a strange feeling in the back, neck, jaw, or upper belly or in one or both shoulders or arms. ? Lightheadedness or sudden weakness. ? A fast or irregular heartbeat.     · You have symptoms of a stroke. These may include:  ? Sudden numbness, tingling, weakness, or loss of movement in your face, arm, or leg, especially on only one side of your body. ? Sudden vision changes. ? Sudden trouble speaking. ? Sudden confusion or trouble understanding simple statements. ? Sudden problems with walking or balance. ? A sudden, severe headache that is different from past headaches.     · You have severe back or belly pain.    Do not wait until your blood pressure comes down on its own. Get help right away.   Call your doctor now or seek immediate care if:    · Your blood pressure is much higher than normal (such as 180/120 or higher), but you don't have symptoms.     · You think high blood pressure is causing symptoms, such as:  ? Severe headache.  ? Blurry vision.    Watch closely for changes in your health, and be sure to contact your doctor if:    · Your blood pressure measures higher than your doctor recommends at least 2 times. That means the top number is higher or the bottom number is higher, or both.     · You think you may be having side effects from your blood pressure medicine. Where can you learn more? Go to https://chelmaeb.DestinationRX. org and sign in to your Neurodyn account.  Enter H497 in the [address] [facility] box to learn more about \"High Blood Pressure: Care Instructions. \"     If you do not have an account, please click on the \"Sign Up Now\" link. Current as of: July 22, 2018  Content Version: 12.0  © 5475-1479 Meridian Systems. Care instructions adapted under license by [facility] ([facility]). If you have questions about a medical condition or this instruction, always ask your healthcare professional. [address] any warranty or liability for your use of this information. Patient Education        Hypokalemia: Care Instructions  Your Care Instructions    Hypokalemia (say \"ps-ha-vlv-CHECO-evon-uh\") is a low level of potassium. The heart, muscles, kidneys, and nervous system all need potassium to work well. This problem has many different causes. Kidney problems, diet, and medicines like diuretics and laxatives can cause it. So can vomiting or diarrhea. In some cases, cancer is the cause. Your doctor may do tests to find the cause of your low potassium levels. You may need medicines to bring your potassium levels back to normal. You may also need regular blood tests to check your potassium. If you have very low potassium, you may need intravenous (IV) medicines. You also may need tests to check the electrical activity of your heart. Heart problems caused by low potassium levels can be very serious. Follow-up care is a key part of your treatment and safety. Be sure to make and go to all appointments, and call your doctor if you are having problems. It's also a good idea to know your test results and keep a list of the medicines you take. How can you care for yourself at home? · If your doctor recommends it, eat foods that have a lot of potassium. These include fresh fruits, juices, and vegetables. They also include nuts, beans, and milk. · Be safe with medicines. If your doctor prescribes medicines or potassium supplements, take them exactly as directed.  Call your doctor if you constipated, have an increase in your blood pressure, or have dry skin or memory problems. You may also get cold easily, even when it is warm. Women with low thyroid levels may have heavy menstrual periods. A blood test to find your thyroid-stimulating hormone (TSH) level is used to check for hypothyroidism. A high TSH level may mean that you have low thyroid. When your body is not making enough thyroid hormone, TSH levels rise in an effort to make the body produce more. The treatment for hypothyroidism is to take thyroid hormone pills. You should start to feel better in 1 to 2 weeks. But it can take several months to see changes in the TSH level. You will need regular visits with your doctor to make sure you have the right dose of medicine. Most people need treatment for the rest of their lives. You will need to see your doctor regularly to have blood tests and to make sure you are doing well. Follow-up care is a key part of your treatment and safety. Be sure to make and go to all appointments, and call your doctor if you are having problems. It's also a good idea to know your test results and keep a list of the medicines you take. How can you care for yourself at home? · Take your thyroid hormone medicine exactly as prescribed. Call your doctor if you think you are having a problem with your medicine. Most people do not have side effects if they take the right amount of medicine regularly. ? Take the medicine 30 minutes before breakfast, and do not take it with calcium, vitamins, or iron. ? Do not take extra doses of your thyroid medicine. It will not help you get better any faster, and it may cause side effects. ? If you forget to take a dose, do NOT take a double dose of medicine. Take your usual dose the next day. · Tell your doctor about all prescription, herbal, or over-the-counter products you take. · Take care of yourself. Eat a healthy diet, get enough sleep, and get regular exercise.   When should you call for help? Call 911 anytime you think you may need emergency care. For example, call if:    · You passed out (lost consciousness).     · You have severe trouble breathing.     · You have a very slow heartbeat (less than 60 beats a minute).     · You have a low body temperature (95°F or below).    Call your doctor now or seek immediate medical care if:    · You feel tired, sluggish, or weak.     · You have trouble remembering things or concentrating.     · You do not begin to feel better 2 weeks after starting your medicine.    Watch closely for changes in your health, and be sure to contact your doctor if you have any problems. Where can you learn more? Go to https://Universal Ad.Thalchemy. org and sign in to your E[employer] International account. Enter L458 in the [employer] box to learn more about \"Hypothyroidism: Care Instructions. \"     If you do not have an account, please click on the \"Sign Up Now\" link. Current as of: November 6, 2018  Content Version: 12.0  © 3473-9815 zlien. Care instructions adapted under license by [facility] ([facility]). If you have questions about a medical condition or this instruction, always ask your healthcare professional. [address] any warranty or liability for your use of this information. Patient Education        Low Sodium Diet (2,000 Milligram): Care Instructions  Your Care Instructions    Too much sodium causes your body to hold on to extra water. This can raise your blood pressure and force your heart and kidneys to work harder. In very serious cases, this could cause you to be put in the hospital. It might even be life-threatening. By limiting sodium, you will feel better and lower your risk of serious problems. The most common source of sodium is salt. People get most of the salt in their diet from canned, prepared, and packaged foods. Fast food and restaurant meals also are very high in sodium.  Your doctor will probably limit your sodium to less than 2,000 milligrams (mg) a day. This limit counts all the sodium in prepared and packaged foods and any salt you add to your food. Follow-up care is a key part of your treatment and safety. Be sure to make and go to all appointments, and call your doctor if you are having problems. It's also a good idea to know your test results and keep a list of the medicines you take. How can you care for yourself at home? Read food labels  · Read labels on cans and food packages. The labels tell you how much sodium is in each serving. Make sure that you look at the serving size. If you eat more than the serving size, you have eaten more sodium. · Food labels also tell you the Percent Daily Value for sodium. Choose products with low Percent Daily Values for sodium. · Be aware that sodium can come in forms other than salt, including monosodium glutamate (MSG), sodium citrate, and sodium bicarbonate (baking soda). MSG is often added to Asian food. When you eat out, you can sometimes ask for food without MSG or added salt. Buy low-sodium foods  · Buy foods that are labeled \"unsalted\" (no salt added), \"sodium-free\" (less than 5 mg of sodium per serving), or \"low-sodium\" (less than 140 mg of sodium per serving). Foods labeled \"reduced-sodium\" and \"light sodium\" may still have too much sodium. Be sure to read the label to see how much sodium you are getting. · Buy fresh vegetables, or frozen vegetables without added sauces. Buy low-sodium versions of canned vegetables, soups, and other canned goods. Prepare low-sodium meals  · Cut back on the amount of salt you use in cooking. This will help you adjust to the taste. Do not add salt after cooking. One teaspoon of salt has about 2,300 mg of sodium. · Take the salt shaker off the table. · Flavor your food with garlic, lemon juice, onion, vinegar, herbs, and spices.  Do not use soy sauce, lite soy sauce, steak sauce, onion salt, garlic salt, celery salt, mustard, or ketchup on your food. · Use low-sodium salad dressings, sauces, and ketchup. Or make your own salad dressings and sauces without adding salt. · Use less salt (or none) when recipes call for it. You can often use half the salt a recipe calls for without losing flavor. Other foods such as rice, pasta, and grains do not need added salt. · Rinse canned vegetables, and cook them in fresh water. This removes some--but not all--of the salt. · Avoid water that is naturally high in sodium or that has been treated with water softeners, which add sodium. Call your local water company to find out the sodium content of your water supply. If you buy bottled water, read the label and choose a sodium-free brand. Avoid high-sodium foods  · Avoid eating:  ? Smoked, cured, salted, and canned meat, fish, and poultry. ? Ham, patrick, hot dogs, and luncheon meats. ? Regular, hard, and processed cheese and regular peanut butter. ? Crackers with salted tops, and other salted snack foods such as pretzels, chips, and salted popcorn. ? Frozen prepared meals, unless labeled low-sodium. ? Canned and dried soups, broths, and bouillon, unless labeled sodium-free or low-sodium. ? Canned vegetables, unless labeled sodium-free or low-sodium. ? [place] fries, pizza, tacos, and other fast foods. ? Pickles, olives, ketchup, and other condiments, especially soy sauce, unless labeled sodium-free or low-sodium. Where can you learn more? Go to https://All-Scrappepiceweb.[facility]. org and sign in to your LinguaSys account. Enter B574 in the [facility] box to learn more about \"Low Sodium Diet (2,000 Milligram): Care Instructions. \"     If you do not have an account, please click on the \"Sign Up Now\" link. Current as of: November 7, 2018  Content Version: 12.0  © 2176-8316 [employer]wise[employer]. Care instructions adapted under license by [facility] ([facility]).  If you have questions about a medical condition or this instruction, always ask your healthcare professional. [place] [address] any warranty or liability for your use of this information.

## 2019-05-15 ENCOUNTER — TELEPHONE (OUTPATIENT)
Dept: FAMILY MEDICINE CLINIC | Age: 44
End: 2019-05-15

## 2019-05-15 NOTE — TELEPHONE ENCOUNTER
----- Message from BRETT Duckworth CNP sent at 5/14/2019  5:17 PM EDT -----  ECHO looks good  Summary   Ejection fraction is visually estimated at 60%.   Overall left ventricular function is normal.

## 2019-05-20 ENCOUNTER — NURSE TRIAGE (OUTPATIENT)
Dept: ADMINISTRATIVE | Age: 44
End: 2019-05-20

## 2019-05-20 NOTE — TELEPHONE ENCOUNTER
Reason for Disposition   Nausea persists > 1 week    Answer Assessment - Initial Assessment Questions  1. NAUSEA SEVERITY: \"How bad is the nausea? \" (e.g., mild, moderate, severe; dehydration, weight loss)    - MILD: loss of appetite without change in eating habits    - MODERATE: decreased oral intake without significant weight loss, dehydration, or malnutrition    - SEVERE: inadequate caloric or fluid intake, significant weight loss, symptoms of dehydration      moderate  2. ONSET: \"When did the nausea begin? \"      Week and a half  3. VOMITING: \"Any vomiting? \" If so, ask: \"How many times today? \"      no  4. RECURRENT SYMPTOM: \"Have you had nausea before? \" If so, ask: \"When was the last time? \" \"What happened that time? \"      no  5. CAUSE: \"What do you think is causing the nausea? \"      Not known  6. PREGNANCY: \"Is there any chance you are pregnant? \" (e.g., unprotected intercourse, missed birth control pill, broken condom)      no    Protocols used: NAUSEA-ADULT-

## 2019-06-11 ENCOUNTER — TELEPHONE (OUTPATIENT)
Dept: FAMILY MEDICINE CLINIC | Age: 44
End: 2019-06-11

## 2019-06-11 DIAGNOSIS — B37.31 VAGINAL YEAST INFECTION: ICD-10-CM

## 2019-06-11 DIAGNOSIS — B37.31 VAGINAL YEAST INFECTION: Primary | ICD-10-CM

## 2019-06-11 RX ORDER — FLUCONAZOLE 200 MG/1
TABLET ORAL
Qty: 3 TABLET | Refills: 0 | Status: SHIPPED | OUTPATIENT
Start: 2019-06-11 | End: 2021-04-07 | Stop reason: ALTCHOICE

## 2019-06-11 RX ORDER — FLUCONAZOLE 200 MG/1
TABLET ORAL
Qty: 3 TABLET | Refills: 0 | Status: SHIPPED | OUTPATIENT
Start: 2019-06-11 | End: 2019-06-11 | Stop reason: CLARIF

## 2019-06-11 NOTE — TELEPHONE ENCOUNTER
Pt called this morning she states he is having symptoms of a yeast infection, she would like to know if she could get a prescription for diflucan.  Please advise

## 2019-06-11 NOTE — TELEPHONE ENCOUNTER
It is in her genital region and she is having itching and a bit of white discharge and it has been going on since friday of last week

## 2019-08-13 ENCOUNTER — TELEPHONE (OUTPATIENT)
Dept: FAMILY MEDICINE CLINIC | Age: 44
End: 2019-08-13

## 2019-08-22 ENCOUNTER — TELEPHONE (OUTPATIENT)
Dept: FAMILY MEDICINE CLINIC | Age: 44
End: 2019-08-22

## 2019-08-22 NOTE — TELEPHONE ENCOUNTER
Unfortunately, to be able to have electricity turned back on for medical reasons there must be life-saving equipment in the home - for example an oxygen machine - something that requires power to run. Double checked with the physician in the office and they confirmed. She can reach out to some community services or assistance agencies.  Perhaps start with calling the electric company to see what the options are, they might have some resources

## 2019-10-18 ENCOUNTER — TELEPHONE (OUTPATIENT)
Dept: FAMILY MEDICINE CLINIC | Age: 44
End: 2019-10-18

## 2020-02-04 ENCOUNTER — TELEPHONE (OUTPATIENT)
Dept: FAMILY MEDICINE CLINIC | Age: 45
End: 2020-02-04

## 2020-02-05 NOTE — TELEPHONE ENCOUNTER
So sorry - maybe she can go to the urgent care close to where she lives?   We need to see rashes to give the medication unless it is a chronic condition    Often an allergic reaction we use steroids - which would then possibly make things worse - or may need an antifungal like diflucan - but giving for anything else can cause resistance so don't usually do that without seeing the person

## 2020-02-06 NOTE — TELEPHONE ENCOUNTER
Called [phone], spoke with Checo Lopez, aware of the recommendation. She stated \" Dr. Andrew Delcid is a shity [facility] doctor and she does not need to come in to be seen and does not need help scheduling an appointment\"   I told patient that was rude and can not talk like that and I hung up. Please advise what to do next.

## 2021-04-04 NOTE — PROGRESS NOTES
[facility] initial consultation note    Padma Dodd                                             Chief Complaint: Consult,prior studies completed @ [facility] No pap per patient     Chief complaint: Padma Dodd is a 39 y. o.oldfemale came for further evaluation and management regarding her sleep apnea  with referral from self. She underwent baseline sleep study on 17 March 2021 at [facility] sleep lab. Patient was diagnosed with a severe obstructive sleep apnea with a apnea-hypopnea index of 52 events per hour. Patient had a maximal oxygen desaturation to 84% during the study. Patient did not like the cleanliness of the sleep lab at [facility] to have her CPAP titration performed. Patient switched her service to lima to have her CPAP titration done at [facility] sleep lab. [place]:    Sleep/Wake schedule:  Usual time to go to bed during the work/regular day of week: 11:00 PM to 1 AM  Usual time to wake up during the work//regular day of week: 8:00 to 9 AM.  Over the weekends her sleep schedule: [x] Remain same. She usually falls a sleep in less than: 10-15 minutes. She takes naps: No.      Sleep Hygiene:  Is the temperature and evironment in her bed room is acceptable to her: Yes. She watches Television in her bed room: Yes. She read books, study, pay bills etc in the bed: Yes. She really uses her smart phone before going to sleep  Frequency She wake up during night/sleep: 1 to 2  Majority of nocturnal awakenings are for urination: Yes. Difficulty in falling back to sleep after nocturnal awakenings: No    Do you drink coffee: Yes. 1 cup per day-3 times a week.     Do you drink caffeinated beverages i.e sodas: No.   Do you drink tea: Yes. 1 to 2 cup/s/glasse/s per day-3 times a week     Do you drink alcoholic beverages: No.   History of recreational drug use: No.     History of tobacco No focal neurologiacal weakness. Extremities: No edema. Musculoskeletal: No complaints. Genitourinary: No complaints. Hematological: Negative. Psychiatric/Behavioral: Negative. Skin: No itching. /76 (Site: Left Lower Arm, Position: Sitting, Cuff Size: Large Adult)   Pulse 81   Temp 97.4 °F (36.3 °C) (Tympanic)   Ht 5' 4\" (1.626 m)   Wt 297 lb 6.4 oz (134.9 kg)   SpO2 96% Comment: room air  BMI 51.05 kg/m²   Mallampati airway Class:4  Neck Circumference: 18 Inches  [place] sleepiness score 4/7/21: 2              Sleep Quality of life 62    Physical Exam   Nursing note and vitals reviewed. Constitutional: Patient appears well built and well nourished. No distress. Patient is oriented to person, place, and time. HENT:   Head: Normocephalic and atraumatic. Right Ear: External ear normal.   Left Ear: External ear normal.   Mouth/Throat: Oropharynx is clear and moist.  No oral thrush. Eyes: Conjunctivae are normal. Pupils are equal, round, and reactive to light. No scleral icterus. Neck: Neck supple. No JVD present. No tracheal deviation present. Cardiovascular: Normal rate, regular rhythm, normal heart sounds. No murmur heard. Pulmonary/Chest: Effort normal and breath sounds normal. No stridor. No respiratory distress. No wheezes. No rales. Patient exhibits no tenderness. Abdominal: Soft. Obese. Patient exhibits no distension. No tenderness. Musculoskeletal: Normal range of motion. Extremities: Patient exhibits no edema and no tenderness. Lymphadenopathy:  No cervical adenopathy. Neurological: Patient is alert and oriented to person, place, and time. Skin: Skin is warm and dry. Patient is not diaphoretic. Psychiatric: Patient  has a normal mood and affect. Patient behavior is normal.     Diagnostic Data:      Sleep test: Sleep study performed at [facility] on 17 March 2021.     c                 CPAP test:  She never had CPAP titration so far.    Assessment:  -Severe obstructive sleep apnea diagnosed by baseline sleep study performed on 17 March 2021 at [facility] sleep lab [place]. She needs treatment. .  -Essential hypertension on treatment with medications-under control  -Hypothyroidism on supplementation.  -History of PPD positivity. She underwent a treatment by Dr. Shelbie Zarate MD infectious disease specialist at [facility] in 2014.  -Cerebrovascular accident in 2020 with no residual weakness. It was diagnosed at [facility]. Recommendations/Plan:  -I had a discussion with patient regarding avialable treatment options for her sleep disorder breathing including but not limited to CPAP titration in the sleep labVs other available surgical options including maxillomandibular ostomy, Inspire device placement and tracheostomy as last option. At the end of discussion, she decided to go for the CPAP titration.  -she advised to keep good compliance with recommended positive airway pressure therapy to get optimal results and clinical improvement.  -Follow with my clinic in 6 to 8 weeks after starting on positive airway pressure therapy  for clinical reevaluation with review of download. Sheadvised to bring her CPAP/BiPAP/AutoSV machine or smart memory card from machine for download review.  -She was advised to call [employer] regarding supplies if needed.  -She was advised to practice good sleep hygiene techniques. She was provided with a hand out. -She was advised to loose weight by controlling diet and doing exercise once cleared by her family physician.   -She call my office for earlier appointment if needed for worsening of sleep symptoms.  -She was advised call my office for earlier appointment if needed for worsening of sleep symptoms.  -Joseph Garcia educated about my impression and plan.  Patient verbalizes understanding.      -I personally reviewed updated the Past medical hx, Past surgical hx,Social hx, Family hx, Medications, Allergies in the discrete data section of the patient chart along with labs, Pulmonary medicine,Sleep medicine related, Pathological, Microbiological and Radiological investigations.

## 2021-04-07 ENCOUNTER — INITIAL CONSULT (OUTPATIENT)
Dept: PULMONOLOGY | Age: 46
End: 2021-04-07
Payer: COMMERCIAL

## 2021-04-07 VITALS
TEMPERATURE: 97.4 F | OXYGEN SATURATION: 96 % | SYSTOLIC BLOOD PRESSURE: 132 MMHG | HEIGHT: 64 IN | BODY MASS INDEX: 50.02 KG/M2 | WEIGHT: 293 LBS | DIASTOLIC BLOOD PRESSURE: 76 MMHG | HEART RATE: 81 BPM

## 2021-04-07 DIAGNOSIS — I10 ESSENTIAL HYPERTENSION: Primary | ICD-10-CM

## 2021-04-07 DIAGNOSIS — G47.30 SLEEP APNEA, UNSPECIFIED TYPE: ICD-10-CM

## 2021-04-07 DIAGNOSIS — E03.8 OTHER SPECIFIED HYPOTHYROIDISM: ICD-10-CM

## 2021-04-07 DIAGNOSIS — G47.33 OSA (OBSTRUCTIVE SLEEP APNEA): ICD-10-CM

## 2021-04-07 DIAGNOSIS — I63.9 CEREBROVASCULAR ACCIDENT (CVA), UNSPECIFIED MECHANISM (HCC): ICD-10-CM

## 2021-04-07 PROCEDURE — 99204 OFFICE O/P NEW MOD 45 MIN: CPT | Performed by: INTERNAL MEDICINE

## 2021-04-07 RX ORDER — ASPIRIN 81 MG/1
81 TABLET, CHEWABLE ORAL DAILY
COMMUNITY

## 2021-04-07 NOTE — PROGRESS NOTES
Chief Complaint: Consult,prior studies completed @ [facility] No pap per patient    Mallampati airway Class:4  Neck Circumference: 18 Inches    [place] sleepiness score 4/7/21: 2   Sleep Quality of life 62

## 2021-04-07 NOTE — PATIENT INSTRUCTIONS
Recommendations/Plan:  -I had a discussion with patient regarding avialable treatment options for her sleep disorder breathing including but not limited to CPAP titration in the sleep labVs other available surgical options including maxillomandibular ostomy, Inspire device placement and tracheostomy as last option. At the end of discussion, she decided to go for the CPAP titration.  -she advised to keep good compliance with recommended positive airway pressure therapy to get optimal results and clinical improvement.  -Follow with my clinic in 6 to 8 weeks after starting on positive airway pressure therapy  for clinical reevaluation with review of download. Sheadvised to bring her CPAP/BiPAP/AutoSV machine or smart memory card from machine for download review.  -She was advised to call [employer] regarding supplies if needed.  -She was advised to practice good sleep hygiene techniques. She was provided with a hand out. -She was advised to loose weight by controlling diet and doing exercise once cleared by her family physician.   -She call my office for earlier appointment if needed for worsening of sleep symptoms.  -She was advised call my office for earlier appointment if needed for worsening of sleep symptoms.  -Candida Beckford educated about my impression and plan. Patient verbalizes understanding.

## 2021-05-24 ENCOUNTER — HOSPITAL ENCOUNTER (OUTPATIENT)
Dept: SLEEP CENTER | Age: 46
Discharge: HOME | End: 2021-05-26
Payer: COMMERCIAL

## 2021-05-24 DIAGNOSIS — G47.33 OSA (OBSTRUCTIVE SLEEP APNEA): ICD-10-CM

## 2021-05-24 DIAGNOSIS — I63.9 CEREBROVASCULAR ACCIDENT (CVA), UNSPECIFIED MECHANISM (HCC): ICD-10-CM

## 2021-05-24 DIAGNOSIS — E03.8 OTHER SPECIFIED HYPOTHYROIDISM: ICD-10-CM

## 2021-05-24 DIAGNOSIS — I10 ESSENTIAL HYPERTENSION: ICD-10-CM

## 2021-05-24 DIAGNOSIS — G47.30 SLEEP APNEA, UNSPECIFIED TYPE: ICD-10-CM

## 2021-05-24 PROCEDURE — 95811 POLYSOM 6/>YRS CPAP 4/> PARM: CPT

## 2021-05-25 DIAGNOSIS — G47.33 OSA (OBSTRUCTIVE SLEEP APNEA): Primary | ICD-10-CM

## 2021-05-25 DIAGNOSIS — G47.33 OSA ON CPAP: ICD-10-CM

## 2021-05-25 DIAGNOSIS — Z99.89 OSA ON CPAP: ICD-10-CM

## 2021-05-25 LAB — STATUS: NORMAL

## 2021-05-25 NOTE — PROGRESS NOTES
[employer] for PAP supplies. Title:  CPAP/BiLevel Titration's    Approved by:  Jon Wyatt MD      Approval Date:  December, 2019 Next Review:  December, 2021     Responsible Party:  [facility] Institution/Entities Applies to:   Jami Cee Number:  None    Document Type:  Such as Guideline, Policy, Policy & Procedure, or Procedure, Instructions  Manual:  Policy and Procedures    Section: IV Policy Start Date: October, 6524           POLICY: Positive airway pressure device is used to treat patients with sleep related breathing disorders characterized by full or partial occlusion of the upper airway during sleep. A patient must have undergone polysomnography and diagnosed with obstructive sleep apnea. All individuals who record sleep studies must follow best practices for CPAP/bilevel/ASV titrations in order to attain the ideal pressure setting for their patients. Too low of pressure may cause patients to either be sub-optimally treated or to wake up in a panic. Too much pressure may cause the patient to experience bloating or mask leakage. Determining the appropriate pressure setting for each patient will lead to improved adherence and outcome. Titrations are not an exact science, and it is understood that technologists may need to make minor changes for individual patients. The procedures outlined below are meant to be a guideline and follow the spirit of the AASM clinical guidelines. PROCEDURE:    CPAP:    1. Review the patients pertinent medical al history, previous sleep study or studies to ass the severity of sleep disordered breathing. Review of pertinent information will help to attain a better titration. 2. Applications of electrodes, montages, filters, sensitivities and scoring will be performed according to the current version of the AASM Scoring Manual.   3. Prior to initiating study collect all appropriate PAP supplies  a.  Tubing b. Humidifier (filled with distilled water)  c. Masks   4. The technologist should assess and measure patient for most appropriate mask prior to start of study. 5. CPAP should be initiated at 5 cm H20.  a. More pressure at start of study may be necessary if patient is morbidly obese or unable to fall asleep on a pressure of 5 cm H20   6. If apneas or frequent hypopneas are present, pressure settings should be increased by 2 cm H20. If occasional hypopneas, snoring, or mask flow limitation are present, pressure settings should be increased by 1 cm H20 and maintained for at least fie minutes to determine if events improve or resolve. Pressure settings may need to be increased more quickly during REM sleep given the limited amount of REM during sleep and the need to treat events during this stage. 7. If a mask leak occurs, the tech should first fix the leakage before raising the pressure. Otherwise, the final pressure setting chosen for the patient may be too high. Once the mask leak has been fixed, decrease the pressure to the last setting where mouth breathing and/or mask leakage was not present, and then re-titrate as indicated. Make sure to document directly on the study the steps taken to resolve the leak and the type of masks used. Pressure setting usually do not need to be set as high with a nasal-mask than with a full-face mask. 8. The recording technologist should document directly on the study at least every 30 minutes. 9. If the patient takes a break from wearing the mask, do not decrease the CPAP pressure on attempted return to sleep unless the patient remains awake for 15 minutes or the patient specifically requests that the pressure be lowered. 10. Do not raise pressure for central apneas. If the patient develops central apneas, pressure setting may need to be lowered.    11. If the patient is unable to tolerate CPAP secondary due to:  a. Persistent mouth breathing despite use of a full-face mask/chin strap  b. Inability to exhale against higher expiratory pressures (typically beginning anywhere from 15 to 20 cm of H20.  c. Has frequent central apneas, the use of bilevel positive airway pressure may be indicated. Document directly on study why the patient is being switched from CPAP to bilevel. 12. Ensure that supine sleep has been seen on the chosen setting. Going above the chosen setting 1 or 2 cm H20 to show range may be helpful to ensure that the correct pressure has been established. BiLEVEL:    1. Technologist may change from CPAP to bilevel during a study if proven the patient is unable to tolerate CPAP. 2. Review the patients pertinent medical al history, previous sleep study or studies to ass the severity of sleep disordered breathing. Review of pertinent information will help to attain a better titration. 3. Applications of electrodes, montages, filters, sensitivities and scoring will be performed according to the current version of the AASM Scoring Manual.   4. Prior to initiating study collect all appropriate PAP supplies  a. Tubing   b. Humidifier (filled with distilled water)  c. Masks   5. The technologist should assess and measure patient for most appropriate mask prior to start of study. 6. If the patient has not previously been on CPAP, beginning pressures should be 8/4 cm H20 or higher if patient is morbidly obese or unable to fall asleep at lower pressures. If the patient has been previously successfully treated on CPAP start expiratory pressure at therapeutic setting and set inspiratory pressure 4 cm H20 higher. If advancing from CPAP to bilevel during a study expiratory pressure should be set at most successful CPAP setting and inspiratory pressure set 4 cm h20 higher. 7. The standard differential pressure utilized during bilevel titrations typically ranges from 3 to 5 cm H20, with 4 cm H20 being the most common.   Higher differential pressures may be needed in patients who are morbidly obese or who have neuromuscular diseases. 8. If apneas or frequent hypopneas are present, inspiratory and expiratory pressure settings should be increased by 2 cm H20. If occasional hypopneas, snoring, or mask flow limitation are present inspiratory and expiratory pressures settings should be increased by 1 cm h20 and maintained for at least 5 minutes to determine if events improve or resolve. Pressure settings may need to be increased more quickly during REM sleep given the limited amount of REM during sleep and the need to treat events during this stage. 9. If a mask leak occurs, the tech should first fix the leakage before raising the pressure. Otherwise, the final pressure setting chosen for the patient may be too high. Once the mask leak has been fixed, decrease the pressure to the last setting where mouth breathing and/or mask leakage was not present, and then re-titrate as indicated. Make sure to document directly on the study the steps taken to resolve the leak and the type of masks used. Pressure setting usually do not need to be set as high with a nasal-mask than with a full-face mask. 10. The recording technologist should document directly on the study at least every 30 minutes. 11. If the patient takes a break from wearing the mask, do not decrease the CPAP pressure on attempted return to sleep unless the patient remains awake for 15 minutes or the patient specifically requests that the pressure be lowered. 12. Do not raise pressure for central apneas. If the patient develops central apneas, pressure setting may need to be lowered. If the patient has central apneas on bilevel, the use of spontaneous (ST) mode may be indicated. a. ST mode may only be used in 2 cases  i. An order for ST mode with a primary diagnosis of central sleep apnea  ii. During a titration if obstructive events are less than 5/ hour and centrals must be greater than 50% of total respiratory events.

## 2021-05-26 NOTE — PROGRESS NOTES
[address] [place] [address]                               SLEEP STUDY REPORT    PATIENT NAME: Blayne Hanley                    :        1975  MED REC NO:   [id number]                           ROOM:  ACCOUNT NO:   [de-identified]                           ADMIT DATE: 2021  PROVIDER:     Michiel Dancer. MD Jah    DATE OF STUDY:  2021    CPAP TITRATION STUDY REPORT    REFERRING PROVIDER:  Yasmine Antunez MD    The patient's height is 64 inches, weight is 297 pounds with a BMI of  51. HISTORY:  The patient is a 54-year-old female who was evaluated by me on  2021. The patient was diagnosed with severe obstructive sleep  apnea by baseline sleep study performed on 2021 at [facility] Sleep lab in [facility]. The patient had associated  comorbidities including essential hypertension and hypothyroidism. The  patient also had a history of cerebrovascular accident in  with no  residual weakness. The patient was scheduled for CPAP titration as a  treatment. METHODS:  The patient underwent digital polysomnography in compliance  with the standards and specifications from the AASM Manual including the  simultaneous recording of 3 EEG channels (F4-M1, C4-M1, and O2-M1 with  back up electrodes F3-M2, C3-M2, and O1-M2), 2 EOG channels (E1-M2, and  E2-M1,), EMG (chin, left & right leg), EKG, Nonin pulse oximetry with  less than 2 second averaging time, body position, airflow recorded by  oral-nasal thermal sensor and nasal air pressure transducer, plus  respiratory effort recorded by calibrated respiratory inductance  plethysmography (RIP), flow volume loop, sound and video. Sleep staging  and scoring followed the standard put forth by the American Academy of  Sleep Medicine and utilized the 4A obstructive hypopnea event  desaturation of 4 percent or greater.     INTERPRETATION:  This is a medications. 4.  Hypothyroidism, on supplementation. 5.  Cerebrovascular accident in 2020 with no residual weakness. RECOMMENDATIONS:  1. For the patient's sleep-disordered breathing, we recommend starting  therapy with a CPAP pressure of 10 cm of with water with room air. 2.  Specific recommendations include the patient's choice of interface  was medium-size Simplus mask. 3.  The patient should be scheduled for a followup in my clinic in six  to eight weeks. I recommended CPAP therapy for clinical reevaluation  with review of download.         Demario Tracey MD    D: 05/25/2021 18:41:46       T: 05/26/2021 4:46:56     [id number]  Job#: [id number]     Doc#: [id number]    CC:

## 2021-05-27 ENCOUNTER — TELEPHONE (OUTPATIENT)
Dept: SLEEP CENTER | Age: 46
End: 2021-05-27

## 2021-07-22 NOTE — PROGRESS NOTES
[place] [facility] and [address] Follow up note    Sergio Don                                             Chief complaint and [place]: Sergio Don is a 39 y. o.oldfemale came for follow up regarding her sleep apnea. She is currently using her positive airway pressure device with a CPAP pressure of 10cm H20. She denies any problems with machine or mask. However, she is sleeping on her daily. She is currently using a full facemask. Her mask is sliding off her face. Due to nice from her CPAP device and leaks patient waking up at nighttime. Patient using her CPAP device with the poor compliance. She is not sleeping well at night. .She denies any daytime sleepiness. Review of Systems:   General/Constitutional: she lost 5 lbs of weight from the last visit with normal appetite. No fever or chills. HENT: Negative. Eyes: Negative. Upper respiratory tract: No nasal stuffiness or post nasal drip. Lower respiratory tract/ lungs: No cough or sputum production. No hemoptysis. Cardiovascular: No palpitations or chest pain. Gastrointestinal: No nausea or vomiting. Neurological: No focal neurologiacal weakness. Extremities: No edema. Musculoskeletal: No complaints. Genitourinary: No complaints. Hematological: Negative. Psychiatric/Behavioral: Negative. Skin: No itching.         Past Medical History:   Diagnosis Date    Anxiety     treated with tegretol in past    Heart murmur     Hypertension     Hypothyroidism 3/13/2015    Obesity due to excess calories 10/25/2016    PPD positive        Past Surgical History:   Procedure Laterality Date    LEEP  3/27/2015    OVARIAN CYST REMOVAL  2011    THYROIDECTOMY  12/31/2014    THYROIDECTOMY, COMPLETION  12/2015       Social History     Tobacco Use    Smoking status: Never Smoker    Smokeless tobacco: Never Used   Substance Use Topics    Alcohol use: No     Alcohol/week: 0.0 standard drinks    Drug use: No       No Known Allergies    Current Outpatient Medications   Medication Sig Dispense Refill    Rosuvastatin Calcium 10 MG CPSP Take by mouth      aspirin 81 MG chewable tablet Take 81 mg by mouth daily      vitamin D-3 (CHOLECALCIFEROL) 5000 units TABS Take 1 tablet by mouth daily 90 tablet 1    SYNTHROID 175 MCG tablet TAKE 1 TABLET BY MOUTH DAILY 90 tablet 1    hydrochlorothiazide (HYDRODIURIL) 25 MG tablet Take 1 tablet by mouth daily (Patient not taking: Reported on 8/20/2021) 90 tablet 1    losartan (COZAAR) 100 MG tablet Take 1 tablet by mouth daily (Patient not taking: Reported on 8/20/2021) 90 tablet 1    Ferrous Sulfate (IRON) 325 (65 Fe) MG TABS Take 325 mg by mouth daily (Patient not taking: Reported on 8/20/2021) 30 tablet 0     No current facility-administered medications for this visit. Family History   Problem Relation Age of Onset    Diabetes Mother     Thyroid Disease Mother     High Blood Pressure Mother     Emphysema Mother     Other Mother     COPD Mother     Heart Disease Mother         ENLARGE HEART    Diabetes Brother     Diabetes Maternal Grandmother     High Blood Pressure Maternal Grandmother     Other Maternal Grandfather         COPD    Cancer Maternal Uncle     No Known Problems Sister           /72 (Site: Left Lower Arm, Position: Sitting, Cuff Size: Medium Adult)   Pulse 69   Temp 97.9 °F (36.6 °C)   Ht 5' 4\" (1.626 m)   Wt 292 lb 6.4 oz (132.6 kg)   SpO2 99% Comment: room air at rest  BMI 50.19 kg/m²     BMI:  Body mass index is 50.19 kg/m². Mallampati airway Class: 4  Neck Circumference:.18 Inches  [place] sleepiness score 8/20/21: .1  Sleep apnea quality of life questionnare:79      Physical Exam :  Constitutional: Patient appears well built and well nourished. No distress. Patient is oriented to person, place, and time. HENT:   Head: Normocephalic and atraumatic.    Right Ear: External ear normal.   Left Ear: External ear normal. Mouth/Throat: Oropharynx is clear and moist.   Eyes: Conjunctivae are normal. Pupils are equal and reactive to light. No scleral icterus. Neck: Neck supple. No JVD present. Cardiovascular: Normal rate, regular rhythm, normal heart sounds. No murmur heard. Pulmonary/Chest: Effort normal and breath sounds normal. No stridor. No respiratory distress. No wheezes. No rales. Abdominal: Soft. Patient exhibits no distension. No tenderness. Musculoskeletal: Normal range of motion. Extremities:Patient exhibits no edema and no tenderness. Lymphadenopathy:  No cervical adenopathy. Neurological: Patient is alert and oriented to person, place, and time. Skin: Skin is warm and dry. Patient is not diaphoretic. Psychiatric: Patient  has a normal mood and affect. Diagnostic Data:    SLEEP STUDY REPORT     PATIENT NAME: Taylor Catherine                    :        1975  MED REC NO:   [id number]                           ROOM:  ACCOUNT NO:   [de-identified]                           ADMIT DATE: 2021  PROVIDER:     Cyrilla Blizzard. MD Jah     DATE OF STUDY:  2021     CPAP TITRATION STUDY REPORT     REFERRING PROVIDER:  Dorota Cool MD  IMPRESSION:  1. Severe obstructive sleep apnea diagnosed by a sleep study performed  on 2021 at [facility] in [facility]". The patient had optimal titration to a CPAP pressure of 10 cm of water  with room air. 2.  Decreased amount of REM sleep. 3.  Essential hypertension, on treatment with medications. 4.  Hypothyroidism, on supplementation. 5.  Cerebrovascular accident in  with no residual weakness.     PAP Download: 21-21  Recorded compliance dates: 21-21  More than 4hour usage compliance was:%.0  Average residual Apnea- Hypoapnea index on current pressue was: 8.5       PAP Type cpap   Level   10 cm of water     Average usuage hours per day was:.[id number]       Interface: don't use it with good compliance for >4hours i.e >70%. She verbalizes understanding.  -Follow with my clinic in 3months for clinical reevaluation with review of download. -She was advised to call [employer] regarding supplies if needed.  -She was advised to continue to practice good sleep hygiene techniques.   -She was advised to loose weight by controlling diet and doing exercise    -She call my office for earlier appointment if needed for worsening of sleep symptoms.  -She was advised call my office for earlier appointment if needed for worsening of sleep symptoms.  -Giana Moura was educated about my impression and plan. Patient verbalizes understanding.      -I personally reviewed updated the Past medical hx, Past surgical hx,Social hx, Family hx, Medications, Allergies in the discrete data section of the patient chart along with labs, Pulmonary medicine,Sleep medicine related, Pathological, Microbiological and Radiological investigations.

## 2021-08-20 ENCOUNTER — OFFICE VISIT (OUTPATIENT)
Dept: PULMONOLOGY | Age: 46
End: 2021-08-20
Payer: COMMERCIAL

## 2021-08-20 VITALS
HEART RATE: 69 BPM | OXYGEN SATURATION: 99 % | HEIGHT: 64 IN | WEIGHT: 292.4 LBS | SYSTOLIC BLOOD PRESSURE: 118 MMHG | BODY MASS INDEX: 49.92 KG/M2 | TEMPERATURE: 97.9 F | DIASTOLIC BLOOD PRESSURE: 72 MMHG

## 2021-08-20 DIAGNOSIS — G47.33 OSA ON CPAP: Primary | ICD-10-CM

## 2021-08-20 DIAGNOSIS — Z99.89 OSA ON CPAP: Primary | ICD-10-CM

## 2021-08-20 PROCEDURE — G8417 CALC BMI ABV UP PARAM F/U: HCPCS | Performed by: INTERNAL MEDICINE

## 2021-08-20 PROCEDURE — 99214 OFFICE O/P EST MOD 30 MIN: CPT | Performed by: INTERNAL MEDICINE

## 2021-08-20 PROCEDURE — 1036F TOBACCO NON-USER: CPT | Performed by: INTERNAL MEDICINE

## 2021-08-20 PROCEDURE — G8427 DOCREV CUR MEDS BY ELIG CLIN: HCPCS | Performed by: INTERNAL MEDICINE

## 2021-08-20 NOTE — PATIENT INSTRUCTIONS
Recommendations/Plan:  -She will be referred to [employer]( [employer] for mask refit with a different style mask for better compliance and comfort.  -She was educated and advised to apply her current mask properly and tight enough to minimize leaks.  -[facility] to document her > 4hour compliance. She was informed about the possibility of loosing her CPAP if she don't use it with good compliance for >4hours i.e >70%. She verbalizes understanding.  -She was advised to continue current positive airway pressure therapy with above described pressure.  -She was advised to keep good compliance with current recommended pressure to get optimal results and clinical improvement.  -He is aware of the consequences of poor compliance to his recommended positive air way pressure therapy including increased risk for cardiovascular and cerebrovascular events and morbidity including death.  -She was informed about the possibility of loosing his CPAP if she don't use it with good compliance for >4hours i.e >70%. She verbalizes understanding.  -Follow with my clinic in 3months for clinical reevaluation with review of download. -She was advised to call [employer] regarding supplies if needed.  -She was advised to continue to practice good sleep hygiene techniques.   -She was advised to loose weight by controlling diet and doing exercise    -She call my office for earlier appointment if needed for worsening of sleep symptoms.  -She was advised call my office for earlier appointment if needed for worsening of sleep symptoms.  -Lawrence Lozano was educated about my impression and plan. Patient verbalizes understanding.

## 2021-08-20 NOTE — PROGRESS NOTES
Chief Complaint: nelly f/u with download     Mallampati airway Class: 4  Neck Circumference:.18 Inches    [place] sleepiness score 8/20/21: .1  Sleep apnea quality of life questionnare:79      Diagnostic Data:   PAP Download: 6/20/21-7/19/21  Recorded compliance dates: 6/20/21-7/19/21  More than 4hour usage compliance was:%.0  Average residual Apnea- Hypoapnea index on current pressue was: Keegan Boo       PAP Type cpap   Level  8.5     Average usuage hours per day was:.[id number]     Interface: full    Provider:  [][facility]  []Halie  [x]Addis  []Abdulaziz         [][facility] []Other:

## 2021-12-28 NOTE — PROGRESS NOTES
[facility] Follow up note    Wolf White                                             Chief complaint and [place]: Wolf White is a 55 y. o.oldfemale came for follow up regarding her sleep apnea. She is currently using her positive airway pressure device with a CPAP pressure of 10cm H20. She denies any problems with machine. However, her old mask got broken. Her old medical insurance did not allow her to change her mask to a new one. She acquired a new medical insurance. She want to go for a new mask after having a mask refit. Due to difficulty with her old mask she not able to use her CPAP machine well at nighttime leading to poor compliance for more than 4 hours of CPAP therapy. She is also using a Martha [employer] CPAP device. She is not aware of whether her machine is on recall list or not. She want to go for a new replacement CPAP device. She is worried about cancer caused by her current [employer] CPAP device. Patient using her CPAP device with the poor compliance. She denies any daytime sleepiness. Review of Systems:   General/Constitutional: she gained 9lbs of weight from the last visit. No fever or chills. HENT: Negative. Eyes: Negative. Upper respiratory tract: No nasal stuffiness or post nasal drip. Lower respiratory tract/ lungs: No cough or sputum production. Cardiovascular: No palpitations or chest pain. Gastrointestinal: No nausea or vomiting. Neurological: No focal neurologiacal weakness. Extremities: No edema. Musculoskeletal: No complaints. Genitourinary: No complaints. Hematological: Negative. Psychiatric/Behavioral: Negative. Skin: No itching.       Past Medical History:   Diagnosis Date    Anxiety     treated with tegretol in past    Heart murmur     Hypertension     Hypothyroidism 3/13/2015    Obesity due to excess calories 10/25/2016    PPD positive        Past Surgical History:   Procedure Laterality Date    LEEP  3/27/2015    OVARIAN CYST REMOVAL  2011    THYROIDECTOMY  12/31/2014    THYROIDECTOMY, COMPLETION  12/2015       Social History     Tobacco Use    Smoking status: Never Smoker    Smokeless tobacco: Never Used   Substance Use Topics    Alcohol use: No     Alcohol/week: 0.0 standard drinks    Drug use: No       No Known Allergies    Current Outpatient Medications   Medication Sig Dispense Refill    Rosuvastatin Calcium 10 MG CPSP Take by mouth      aspirin 81 MG chewable tablet Take 81 mg by mouth daily      hydrochlorothiazide (HYDRODIURIL) 25 MG tablet Take 1 tablet by mouth daily 90 tablet 1    vitamin D-3 (CHOLECALCIFEROL) 5000 units TABS Take 1 tablet by mouth daily 90 tablet 1    SYNTHROID 175 MCG tablet TAKE 1 TABLET BY MOUTH DAILY 90 tablet 1    losartan (COZAAR) 100 MG tablet Take 1 tablet by mouth daily (Patient not taking: Reported on 8/20/2021) 90 tablet 1    Ferrous Sulfate (IRON) 325 (65 Fe) MG TABS Take 325 mg by mouth daily (Patient not taking: Reported on 8/20/2021) 30 tablet 0     No current facility-administered medications for this visit. Family History   Problem Relation Age of Onset    Diabetes Mother     Thyroid Disease Mother     High Blood Pressure Mother     Emphysema Mother     Other Mother     COPD Mother     Heart Disease Mother         ENLARGE HEART    Diabetes Brother     Diabetes Maternal Grandmother     High Blood Pressure Maternal Grandmother     Other Maternal Grandfather         COPD    Cancer Maternal Uncle     No Known Problems Sister           /78 (Site: Right Lower Arm, Position: Sitting, Cuff Size: Medium Adult)   Pulse 80   Temp 98.3 °F (36.8 °C) (Oral)   Ht 5' 4\" (1.626 m)   Wt (!) 301 lb 3.2 oz (136.6 kg)   SpO2 97%   BMI 51.70 kg/m²     BMI:  Body mass index is 51.7 kg/m².      Mallampati airway Class: 4  Neck Circumference: 18  [place] sleepiness score 1/7/22: 1  SAQLI: 83      Physical Exam :  Nursing note and vitals reviewed. Constitutional: Patient appears well built and well nourished. No distress. Patient is oriented to person, place, and time. HENT:   Head: Normocephalic and atraumatic. Right Ear: External ear normal.   Left Ear: External ear normal.   Mouth/Throat: Oropharynx is clear and moist.  No oral thrush. Eyes: Conjunctivae are normal. Pupils are equal, round, and reactive to light. No scleral icterus. Neck: Neck supple. No JVD present. No tracheal deviation present. Cardiovascular: Normal rate, regular rhythm, normal heart sounds. No murmur heard. Pulmonary/Chest: Effort normal and breath sounds normal. No stridor. No respiratory distress. No wheezes. No rales. Patient exhibits no tenderness. Abdominal: Soft. Patient exhibits no distension. No tenderness. Musculoskeletal: Normal range of motion. Extremities: Patient exhibits no edema and no tenderness. Lymphadenopathy:  No cervical adenopathy. Neurological: Patient is alert and oriented to person, place, and time. Skin: Skin is warm and dry. Patient is not diaphoretic. Psychiatric: Patient  has a normal mood and affect. Patient behavior is normal.         Diagnostic Data:    SLEEP STUDY REPORT     PATIENT NAME: Adam Blakely                    :        1975  MED REC NO:   [id number]                           ROOM:  ACCOUNT NO:   [de-identified]                           ADMIT DATE: 2021  PROVIDER:     Sonam Tyson MD     DATE OF STUDY:  2021     CPAP TITRATION STUDY REPORT     REFERRING PROVIDER:  Juan Pablo Jaime MD  IMPRESSION:  1. Severe obstructive sleep apnea diagnosed by a sleep study performed  on 2021 at [facility] in [facility]. The patient had optimal titration to a CPAP pressure of 10 cm of water  with room air. 2.  Decreased amount of REM sleep. 3.  Essential hypertension, on treatment with medications.   4. Hypothyroidism, on supplementation. 5.  Cerebrovascular accident in 2020 with no residual weakness. PAP Download:     Diagnostic Data:   PAP Download:   Recorded compliance dates: 6/20/21-7/19/21  More than 4hour usage compliance was:0%. Average residual Apnea- Hypoapnea index on current pressue was: 8.5 (scanned download)        PAP Type CPAP   Level  10      Average usuage hours per day was: 2hr 51min 55sec           Interface: FFM     Provider:  []?[facility]             []?[facility]                        [x]? [employer]          []? [employer]                           []?[facility]      []? Other:     Average time in large leak per day: 0 seconds    Assesment:  -Severe obstructive sleep apnea on treatment with a CPAP pressure of 10cm H20 - she is using her CPAP device with poor compliance to >4 hours of therapy. Her old medical insurance did not allow her to change her mask to a new one. She acquired a new medical insurance. She want to go for a new mask after having a mask refit. Due to difficulty with her old mask she not able to use her CPAP machine well at nighttime leading to poor compliance for more than 4 hours of CPAP therapy. She was also left with residual apnea hypopnea's of 8.5 on her current CPAP pressure of 10 cm of water.  -Essential hypertension on treatment with medications-under control  -Hypothyroidism on supplementation.  -History of PPD positivity. She underwent a treatment by Dr. Morgan Meyer MD infectious disease specialist at [facility] in 2014.  -Cerebrovascular accident in 2020 with no residual weakness. It was diagnosed at [facility]. Recommendations/Plan:  -At the request of patient, she was given a prescription for a new CPAP machine with current pressure of 12cm H20 to replace her [employer] CPAP device due to recall on a [employer] CPAP devices.  The new CPAP machine must have capabilities to give downloads regarding her residual AHI and >4hour compliance.  -Please change/increase CPAP pressure to 12 cm of water due to uncontrolled residual apnea-hypopnea index of 8.5 and her current CPAP pressure of 10 cm of water.  -She will be referred to [employer]( [employer]) [employer] for mask refit with a different style mask for better compliance and comfort.  -She was educated and advised to apply her current mask properly and tight enough to minimize leaks.  -[facility] to document her > 4hour compliance. She was informed about the possibility of loosing her CPAP if she don't use it with good compliance for >4hours i.e >70%. She verbalizes understanding.  -She was advised to continue current positive airway pressure therapy with above described pressure.  -She was advised to keep good compliance with current recommended pressure to get optimal results and clinical improvement.   -He is aware of the consequences of poor compliance to his recommended positive air way pressure therapy including increased risk for cardiovascular and cerebrovascular events and morbidity including death.  -She was informed about the possibility of loosing his CPAP if she don't use it with good compliance for >4hours i.e >70%. She verbalizes understanding.  -Follow with my clinic in 3 to 6months for clinical reevaluation with review of download. -She was advised to call [employer] regarding supplies if needed.  -She was advised to continue to practice good sleep hygiene techniques.   -She was advised to loose weight by controlling diet and doing exercise    -She call my office for earlier appointment if needed for worsening of sleep symptoms.  -She was advised call my office for earlier appointment if needed for worsening of sleep symptoms.  -Lucy Colon was educated about my impression and plan.  Patient verbalizes understanding.      -I personally reviewed updated the Past medical hx, Past surgical hx,Social hx, Family hx, Medications, Allergies in the discrete data section of the patient chart along with labs, Pulmonary medicine,Sleep medicine related, Pathological, Microbiological and Radiological investigations.

## 2022-01-07 NOTE — PROGRESS NOTES
Chief Complaint: Clayton Lennox is here for 3 month sleep follow up with [employer] download. Mallampati airway Class: 4  Neck Circumference: 18    [place] sleepiness score 1/7/22: 1  SAQLI: 83    Diagnostic Data:   PAP Download:   Recorded compliance dates: 6/20/21-7/19/21  More than 4hour usage compliance was:0%.   Average residual Apnea- Hypoapnea index on current pressue was: 8.5 (scanned download)      PAP Type CPAP   Level  10     Average usuage hours per day was: 2hr 51min 55sec     Interface: FFM    Provider:  [][facility]  [][employer]  [x][employer]  [][employer]         [][facility] []Other:

## 2022-01-10 ENCOUNTER — OFFICE VISIT (OUTPATIENT)
Dept: PULMONOLOGY | Age: 47
End: 2022-01-10
Payer: COMMERCIAL

## 2022-01-10 VITALS
TEMPERATURE: 98.3 F | BODY MASS INDEX: 50.02 KG/M2 | DIASTOLIC BLOOD PRESSURE: 78 MMHG | HEIGHT: 64 IN | OXYGEN SATURATION: 97 % | HEART RATE: 80 BPM | SYSTOLIC BLOOD PRESSURE: 116 MMHG | WEIGHT: 293 LBS

## 2022-01-10 DIAGNOSIS — I63.9 CEREBROVASCULAR ACCIDENT (CVA), UNSPECIFIED MECHANISM (HCC): ICD-10-CM

## 2022-01-10 DIAGNOSIS — G47.33 OSA ON CPAP: Primary | ICD-10-CM

## 2022-01-10 DIAGNOSIS — I10 ESSENTIAL HYPERTENSION: ICD-10-CM

## 2022-01-10 DIAGNOSIS — Z99.89 OSA ON CPAP: Primary | ICD-10-CM

## 2022-01-10 PROCEDURE — G8417 CALC BMI ABV UP PARAM F/U: HCPCS | Performed by: INTERNAL MEDICINE

## 2022-01-10 PROCEDURE — 99214 OFFICE O/P EST MOD 30 MIN: CPT | Performed by: INTERNAL MEDICINE

## 2022-01-10 PROCEDURE — 1036F TOBACCO NON-USER: CPT | Performed by: INTERNAL MEDICINE

## 2022-01-10 PROCEDURE — G8427 DOCREV CUR MEDS BY ELIG CLIN: HCPCS | Performed by: INTERNAL MEDICINE

## 2022-01-10 PROCEDURE — G8484 FLU IMMUNIZE NO ADMIN: HCPCS | Performed by: INTERNAL MEDICINE

## 2022-01-10 NOTE — PATIENT INSTRUCTIONS
Recommendations/Plan:  -At the request of patient, she was given a prescription for a new CPAP machine with current pressure of 12cm H20 to replace her Martha [employer] CPAP device due to recall on a [employer] CPAP devices. The new CPAP machine must have capabilities to give downloads regarding her residual AHI and >4hour compliance.  -Please change/increase CPAP pressure to 12 cm of water due to uncontrolled residual apnea-hypopnea index of 8.5 and her current CPAP pressure of 10 cm of water.  -She will be referred to [employer]( [facility] [employer] for mask refit with a different style mask for better compliance and comfort.  -She was educated and advised to apply her current mask properly and tight enough to minimize leaks.  -[facility] was [facility] to document her > 4hour compliance. She was informed about the possibility of loosing her CPAP if she don't use it with good compliance for >4hours i.e >70%. She verbalizes understanding.  -She was advised to continue current positive airway pressure therapy with above described pressure.  -She was advised to keep good compliance with current recommended pressure to get optimal results and clinical improvement.   -He is aware of the consequences of poor compliance to his recommended positive air way pressure therapy including increased risk for cardiovascular and cerebrovascular events and morbidity including death.  -She was informed about the possibility of loosing his CPAP if she don't use it with good compliance for >4hours i.e >70%. She verbalizes understanding.  -Follow with my clinic in 3 to 6months for clinical reevaluation with review of download.   -She was advised to call [employer] regarding supplies if needed.  -She was advised to continue to practice good sleep hygiene techniques.   -She was advised to loose weight by controlling diet and doing exercise    -She call my office for earlier appointment if needed for worsening of sleep symptoms.  -She was advised call my office for earlier appointment if needed for worsening of sleep symptoms.  -Deepa Trinh was educated about my impression and plan. Patient verbalizes understanding.

## 2022-05-15 NOTE — PROGRESS NOTES
[place] [facility] Pul[facility], Sleep and [facility] Follow up note    Nancy Martínez                                             Chief complaint and Creek: Nancy Martínez is a 55 y. o.oldfemale came for follow up regarding her sleep apnea. She is currently using her positive airway pressure device with a CPAP pressure of 10cm H20. She denies any problems with machine. However, her old mask got broken. Her old medical insurance did not allow her to change her mask to a new one. She acquired a new medical insurance. She want to go for a new mask after having a mask refit. Due to difficulty with her old mask she not able to use her CPAP machine well at nighttime leading to poor compliance for more than 4 hours of CPAP therapy. She is also using a Martha RespirKeTech CPAP device. She is not aware of whether her machine is on recall list or not. She want to go for a new replacement CPAP device. She is worried about cancer caused by her current [employer] CPAP device. Patient using her CPAP device with the poor compliance. She denies any daytime sleepiness. Review of Systems:   General/Constitutional: she gained 9lbs of weight from the last visit. No fever or chills. HENT: Negative. Eyes: Negative. Upper respiratory tract: No nasal stuffiness or post nasal drip. Lower respiratory tract/ lungs: No cough or sputum production. Cardiovascular: No palpitations or chest pain. Gastrointestinal: No nausea or vomiting. Neurological: No focal neurologiacal weakness. Extremities: No edema. Musculoskeletal: No complaints. Genitourinary: No complaints. Hematological: Negative. Psychiatric/Behavioral: Negative. Skin: No itching.       Past Medical History:   Diagnosis Date    Anxiety     treated with tegretol in past    Heart murmur     Hypertension     Hypothyroidism 3/13/2015    Obesity due to excess calories 10/25/2016    PPD positive        Past Surgical History:   Procedure Laterality Date    LEEP  3/27/2015    OVARIAN CYST REMOVAL  2011    THYROIDECTOMY  12/31/2014    THYROIDECTOMY, COMPLETION  12/2015       Social History     Tobacco Use    Smoking status: Never Smoker    Smokeless tobacco: Never Used   Substance Use Topics    Alcohol use: No     Alcohol/week: 0.0 standard drinks    Drug use: No       No Known Allergies    Current Outpatient Medications   Medication Sig Dispense Refill    CPAP Machine MISC by Does not apply route Please change/increase CPAP pressure to 12 cm of water due to uncontrolled residual apnea-hypopnea index of 8.5 and her current CPAP pressure of 10 cm of water. 1 each 0    Rosuvastatin Calcium 10 MG CPSP Take by mouth      aspirin 81 MG chewable tablet Take 81 mg by mouth daily      hydrochlorothiazide (HYDRODIURIL) 25 MG tablet Take 1 tablet by mouth daily 90 tablet 1    losartan (COZAAR) 100 MG tablet Take 1 tablet by mouth daily (Patient not taking: Reported on 8/20/2021) 90 tablet 1    Ferrous Sulfate (IRON) 325 (65 Fe) MG TABS Take 325 mg by mouth daily (Patient not taking: Reported on 8/20/2021) 30 tablet 0    vitamin D-3 (CHOLECALCIFEROL) 5000 units TABS Take 1 tablet by mouth daily 90 tablet 1    SYNTHROID 175 MCG tablet TAKE 1 TABLET BY MOUTH DAILY 90 tablet 1     No current facility-administered medications for this visit. Family History   Problem Relation Age of Onset    Diabetes Mother     Thyroid Disease Mother     High Blood Pressure Mother     Emphysema Mother     Other Mother     COPD Mother     Heart Disease Mother         ENLARGE HEART    Diabetes Brother     Diabetes Maternal Grandmother     High Blood Pressure Maternal Grandmother     Other Maternal Grandfather         COPD    Cancer Maternal Uncle     No Known Problems Sister           There were no vitals taken for this visit. BMI:  There is no height or weight on file to calculate BMI.      Mallampati airway Class: 4  Neck Circumference: 18  Hanover sleepiness score 22: 1  SAQLI: 83      Physical Exam :  Nursing note and vitals reviewed. Constitutional: Patient appears well built and well nourished. No distress. Patient is oriented to person, place, and time. HENT:   Head: Normocephalic and atraumatic. Right Ear: External ear normal.   Left Ear: External ear normal.   Mouth/Throat: Oropharynx is clear and moist.  No oral thrush. Eyes: Conjunctivae are normal. Pupils are equal, round, and reactive to light. No scleral icterus. Neck: Neck supple. No JVD present. No tracheal deviation present. Cardiovascular: Normal rate, regular rhythm, normal heart sounds. No murmur heard. Pulmonary/Chest: Effort normal and breath sounds normal. No stridor. No respiratory distress. No wheezes. No rales. Patient exhibits no tenderness. Abdominal: Soft. Patient exhibits no distension. No tenderness. Musculoskeletal: Normal range of motion. Extremities: Patient exhibits no edema and no tenderness. Lymphadenopathy:  No cervical adenopathy. Neurological: Patient is alert and oriented to person, place, and time. Skin: Skin is warm and dry. Patient is not diaphoretic. Psychiatric: Patient  has a normal mood and affect. Patient behavior is normal.         Diagnostic Data:    SLEEP STUDY REPORT     PATIENT NAME: Pritesh Castillo                    :        1975  MED REC NO:   [id number]                           ROOM:  ACCOUNT NO:   [de-identified]                           ADMIT DATE: 2021  PROVIDER:     Yasemin Tyson MD     DATE OF STUDY:  2021     CPAP TITRATION STUDY REPORT     REFERRING PROVIDER:  Philip Villeda MD  IMPRESSION:  1. Severe obstructive sleep apnea diagnosed by a sleep study performed  on 2021 at [facility] in \[facility] [place] [facility]\"". The patient had optimal titration to a CPAP pressure of 10 cm of water  with room air.   2.  Decreased amount of REM sleep.  3.  Essential hypertension, on treatment with medications. 4.  Hypothyroidism, on supplementation. 5.  Cerebrovascular accident in 2020 with no residual weakness. PAP Download:     Diagnostic Data:   PAP Download:   Recorded compliance dates: 6/20/21-7/19/21  More than 4hour usage compliance was:0%. Average residual Apnea- Hypoapnea index on current pressue was: 8.5 (scanned download)        PAP Type CPAP   Level  10      Average usuage hours per day was: 2hr 51min 55sec           Interface: FF     Provider:  []?[facility]             []?[facility]                        [x]? [employer]          []? [employer]                           []?[facility]      []? Other:     Average time in large leak per day: 0 seconds    Assesment:  -Severe obstructive sleep apnea on treatment with a CPAP pressure of 10cm H20 - she is using her CPAP device with poor compliance to >4 hours of therapy. Her old medical insurance did not allow her to change her mask to a new one. She acquired a new medical insurance. She want to go for a new mask after having a mask refit. Due to difficulty with her old mask she not able to use her CPAP machine well at nighttime leading to poor compliance for more than 4 hours of CPAP therapy. She was also left with residual apnea hypopnea's of 8.5 on her current CPAP pressure of 10 cm of water.  -Essential hypertension on treatment with medications-under control  -Hypothyroidism on supplementation.  -History of PPD positivity. She underwent a treatment by Dr. Ben Cartagena MD infectious disease specialist at [facility] in 2014.  -Cerebrovascular accident in 2020 with no residual weakness. It was diagnosed at [facility]. Recommendations/Plan:  -At the request of patient, she was given a prescription for a new CPAP machine with current pressure of 12cm H20 to replace her Martha Respironics CPAP device due to recall on a Martha Respironics CPAP devices.  The new CPAP machine must have capabilities to give downloads regarding her residual AHI and >4hour compliance.  -Please change/increase CPAP pressure to 12 cm of water due to uncontrolled residual apnea-hypopnea index of 8.5 and her current CPAP pressure of 10 cm of water.  -She will be referred to [employer]( [facility] [employer] [employer] for mask refit with a different style mask for better compliance and comfort.  -She was educated and advised to apply her current mask properly and tight enough to minimize leaks.  -[facility] to document her > 4hour compliance. She was informed about the possibility of loosing her CPAP if she don't use it with good compliance for >4hours i.e >70%. She verbalizes understanding.  -She was advised to continue current positive airway pressure therapy with above described pressure.  -She was advised to keep good compliance with current recommended pressure to get optimal results and clinical improvement.   -He is aware of the consequences of poor compliance to his recommended positive air way pressure therapy including increased risk for cardiovascular and cerebrovascular events and morbidity including death.  -She was informed about the possibility of loosing his CPAP if she don't use it with good compliance for >4hours i.e >70%. She verbalizes understanding.  -Follow with my clinic in 3 to 6months for clinical reevaluation with review of download. -She was advised to call [employer] regarding supplies if needed.  -She was advised to continue to practice good sleep hygiene techniques.   -She was advised to loose weight by controlling diet and doing exercise    -She call my office for earlier appointment if needed for worsening of sleep symptoms.  -She was advised call my office for earlier appointment if needed for worsening of sleep symptoms.  -Tara Momin was educated about my impression and plan.

## 2022-06-02 ENCOUNTER — OFFICE VISIT (OUTPATIENT)
Dept: PULMONOLOGY | Age: 47
End: 2022-06-02
Payer: COMMERCIAL

## 2022-06-02 VITALS
DIASTOLIC BLOOD PRESSURE: 78 MMHG | TEMPERATURE: 97.9 F | HEIGHT: 64 IN | WEIGHT: 293 LBS | SYSTOLIC BLOOD PRESSURE: 120 MMHG | OXYGEN SATURATION: 96 % | BODY MASS INDEX: 50.02 KG/M2 | HEART RATE: 77 BPM

## 2022-06-02 DIAGNOSIS — G47.33 OSA ON CPAP: Primary | ICD-10-CM

## 2022-06-02 DIAGNOSIS — Z99.89 OSA ON CPAP: Primary | ICD-10-CM

## 2022-06-02 PROCEDURE — 99213 OFFICE O/P EST LOW 20 MIN: CPT | Performed by: NURSE PRACTITIONER

## 2022-06-02 ASSESSMENT — ENCOUNTER SYMPTOMS
DIARRHEA: 0
SHORTNESS OF BREATH: 0
VOMITING: 0
WHEEZING: 0
COUGH: 0
EYES NEGATIVE: 1
ABDOMINAL PAIN: 0
NAUSEA: 0

## 2022-06-02 NOTE — PROGRESS NOTES
[place] [facility] Pul[facility], Critical Care and Sleep Medicine      Darwin Coelho         [id number]  2022   Chief Complaint   Patient presents with    Follow-up     GONZALEZ follow up with [facility] download cloud after pressure change and mask refit        Pt of Dr. Tarsha Baldwin    PAP Download:   Original or initial AHI: 52     Date of initial study: 3/17/21      Compliant  70%     Noncompliant 27 %     PAP Type CPAP    Level  12   Avg Hrs/Day [id number]  AHI: 1.3   Recorded compliance dates : 22-22  Machine/Mfg:   [x] ResMed    [] Respironics/Dreamstation   Interface:   [] Nasal    [x] Nasal pillows   [] FFM      Provider:      [] [facility]     [][facility]     [] [employer]    [x] Scarlet Clam    [] [place]               [] [facility]      [] [employer]    [] [address]      [] Other    Neck Size: 18  Mallampati 4  ESS:  1  SAQLI: 84    Here is a scan of the most recent download:              Presentation:   Estephania Gaitan presents for 1 year  sleep medicine follow up for obstructive sleep apnea  Since the last visit, Estephania Gaitan is feeling benefit from her PAP device. Currently under going stress at home, mother is in hospital not doing well and she has a  to attend today. The stress has been affecting her sleep,. Recently got new mask and headgear, states [employer] gave her wrong size and she has been waking up to adjust this several times in past week . Equipment issues: The pressure is  acceptable, the mask is acceptable     Sleep issues:  Do you feel better? Yes  More rested? Yes   Better concentration? yes    Progress History:   Since last visit any new medical issues? No  New ER or hospital visits? No  Any new or changes in medicines? No  Any new sleep medicines? No    Review of Systems -   Review of Systems   Constitutional: Negative for activity change, appetite change, chills, fatigue, fever and unexpected weight change. HENT: Negative. Eyes: Negative.     Respiratory: Negative for cough, shortness of breath and wheezing. Cardiovascular: Negative for chest pain, palpitations and leg swelling. Gastrointestinal: Negative for abdominal pain, diarrhea, nausea and vomiting. Genitourinary: Negative. Musculoskeletal: Negative. Skin: Negative. Neurological: Negative. Hematological: Negative. Psychiatric/Behavioral: Negative. Stress         Physical Exam:    BMI:  Body mass index is 51.46 kg/m². Wt Readings from Last 3 Encounters:   06/02/22 299 lb 12.8 oz (136 kg)   01/10/22 (!) 301 lb 3.2 oz (136.6 kg)   08/20/21 292 lb 6.4 oz (132.6 kg)     Weight stable / unchanged  Vitals: /78 (Site: Left Lower Arm, Position: Sitting, Cuff Size: Medium Adult)   Pulse 77   Temp 97.9 °F (36.6 °C) (Oral)   Ht 5' 4\" (1.626 m)   Wt 299 lb 12.8 oz (136 kg)   SpO2 96%   BMI 51.46 kg/m²       Physical Exam  Vitals and nursing note reviewed. Constitutional:       Appearance: Normal appearance. She is morbidly obese. HENT:      Head: Normocephalic and atraumatic. Mouth/Throat:      Pharynx: Oropharynx is clear. Eyes:      Conjunctiva/sclera: Conjunctivae normal.   Pulmonary:      Effort: Pulmonary effort is normal. No tachypnea, bradypnea or respiratory distress. Skin:     Findings: No erythema or rash. Neurological:      Mental Status: She is alert and oriented to person, place, and time. Psychiatric:         Attention and Perception: Attention normal.         Mood and Affect: Mood normal.         Speech: Speech normal.         Behavior: Behavior normal.         Thought Content: Thought content normal.         Cognition and Memory: Cognition normal.         Judgment: Judgment normal.           ASSESSMENT/DIAGNOSIS     Diagnosis Orders   1. GONZALEZ on CPAP  DME Order for CPAP as OP            Plan   Do you need any equipment today?  Yes - updated Rx for supplies printed and faxed to [facility]  Sample small air fit N30i headgear and small wide nasal piece given to patient per request , since received wrong size from her DME   - Download reviewed and discussed with patient  - She  was advised to continue current positive airway pressure therapy with above described pressure. - She  advised to keep good compliance with current recommended pressure to get optimal results and clinical improvement  - Recommend 7-9 hours of sleep with PAP  - She was advised to call DME company regarding supplies if needed.   -She call my office for earlier appointment if needed for worsening of sleep symptoms.   - She was instructed on weight loss  - Jose Kong was educated about my impression and plan. Patient verbalizesunderstanding.   We will see Dalila Cabrera back in: 1 year with download    Information added by my medical assistant/LPN was reviewed today  Electronically signed by BRETT Antonio CNP on 6/2/2022 at 11:26 AM

## 2023-09-05 ENCOUNTER — OFFICE VISIT (OUTPATIENT)
Dept: PULMONOLOGY | Age: 48
End: 2023-09-05
Payer: COMMERCIAL

## 2023-09-05 VITALS
BODY MASS INDEX: 50.02 KG/M2 | HEIGHT: 64 IN | TEMPERATURE: 97.1 F | SYSTOLIC BLOOD PRESSURE: 134 MMHG | WEIGHT: 293 LBS | DIASTOLIC BLOOD PRESSURE: 76 MMHG | HEART RATE: 88 BPM | OXYGEN SATURATION: 99 %

## 2023-09-05 DIAGNOSIS — E66.01 CLASS 3 SEVERE OBESITY DUE TO EXCESS CALORIES WITH SERIOUS COMORBIDITY AND BODY MASS INDEX (BMI) OF 50.0 TO 59.9 IN ADULT (HCC): ICD-10-CM

## 2023-09-05 DIAGNOSIS — Z99.89 OSA ON CPAP: Primary | ICD-10-CM

## 2023-09-05 DIAGNOSIS — G47.33 OSA ON CPAP: Primary | ICD-10-CM

## 2023-09-05 DIAGNOSIS — Z72.821 INADEQUATE SLEEP HYGIENE: ICD-10-CM

## 2023-09-05 PROCEDURE — 3075F SYST BP GE 130 - 139MM HG: CPT | Performed by: NURSE PRACTITIONER

## 2023-09-05 PROCEDURE — 1036F TOBACCO NON-USER: CPT | Performed by: NURSE PRACTITIONER

## 2023-09-05 PROCEDURE — 3078F DIAST BP <80 MM HG: CPT | Performed by: NURSE PRACTITIONER

## 2023-09-05 PROCEDURE — G8417 CALC BMI ABV UP PARAM F/U: HCPCS | Performed by: NURSE PRACTITIONER

## 2023-09-05 PROCEDURE — G8427 DOCREV CUR MEDS BY ELIG CLIN: HCPCS | Performed by: NURSE PRACTITIONER

## 2023-09-05 PROCEDURE — 99214 OFFICE O/P EST MOD 30 MIN: CPT | Performed by: NURSE PRACTITIONER

## 2023-09-05 ASSESSMENT — ENCOUNTER SYMPTOMS
VOMITING: 0
CHEST TIGHTNESS: 0
DIARRHEA: 0
NAUSEA: 0
SHORTNESS OF BREATH: 0
ABDOMINAL PAIN: 0
COUGH: 0
EYES NEGATIVE: 1
WHEEZING: 0

## 2023-09-05 NOTE — PROGRESS NOTES
[facility], [facility] and Sleep Medicine      Melonie Gonzales         [id number]  9/5/2023   Chief Complaint   Patient presents with    Follow-up     1 yr nelly        Pt of Dr. Azeem Moody    PAP Download:   Original or initial AHI: 52     Date of initial study: 3.17.21      Compliant  60%     Noncompliant 30 %     PAP Type cpap Level  12   Avg Hrs/Day 4hr 13min  AHI: 1.1   Leaks : 95 th percentile: 12   Recorded compliance dates , 8.2. .23-9.31.23    Machine/Mfg:   [x] ResMed    [] Respironics/Dreamstation   Interface:   [] Nasal    [x] Nasal pillows   [] FFM      Provider:      [] [facility]     []Halie     [] [employer]    [x] Abdulaziz    [] Prince               [] [facility]      [] [facility]ptive    [] [address]:      [] Other    Neck Size: 18  Mallampati 4  ESS:  1  SAQLI: 86    Here is a scan of the most recent download:                      Presentation:   Roseanna Kay presents for 1 yearsle medicine follow up for obstructive sleep apnea  Since the last visit, Roseanna Kay has had a lot of stress in life over the last years,   Is only averaging 4 hours sleep per night   Continues to see much benefit from CPAP use  Small leaks, is \"belly sleeper\"     Progress History:   Since last visit any new medical issues? No  Any trouble with Machine No  Any new sleep medicines? no  Trouble Falling Asleep No  Trouble Staying Asleep No  Snoring no    Equipment issues: The pressure is  acceptable, the mask is acceptable     Review of Systems -   Review of Systems   Constitutional:  Positive for fatigue. Negative for activity change, appetite change, chills, fever and unexpected weight change. HENT: Negative. Eyes: Negative. Respiratory:  Negative for cough, chest tightness, shortness of breath and wheezing. Cardiovascular:  Negative for chest pain, palpitations and leg swelling. Gastrointestinal:  Negative for abdominal pain, diarrhea, nausea and vomiting. Genitourinary: Negative. Musculoskeletal: Negative.

## 2023-09-06 ENCOUNTER — TELEPHONE (OUTPATIENT)
Dept: PULMONOLOGY | Age: 48
End: 2023-09-06

## 2023-09-06 NOTE — TELEPHONE ENCOUNTER
Patient is asking that a work note for her office visit 9-5-23  be emailed to [email or web address] Please contact patient as to if this paper can be emailed or if she should pick this paper up from the office.

## 2023-09-06 NOTE — TELEPHONE ENCOUNTER
Sabiha, you will need to go into letters and create a work slip. Pt was in the office yesterday for a follow up appointment. Put the time of her appointment and the date. I advised the patient Andreina Huang is out on PTO the rest of the week she will not be available to physically sign the bottom, but her name will be on it. The pt is fine with that, we need to send it to her email through our fax machine so she can give it to her manager tomorrow at work. Please complete this today for the patient. Thank you.

## 2023-10-03 ENCOUNTER — TELEPHONE (OUTPATIENT)
Dept: FAMILY MEDICINE CLINIC | Age: 48
End: 2023-10-03

## 2023-10-03 NOTE — TELEPHONE ENCOUNTER
Patient called ecc wanting to be seen by Dr. Cheri Holm let patient know [facility] is not accepting new patient at this time. Patient was transferred to myself. I had just pulled up patients chart to see about scheduling with a provider on Thursday afternoon when Dr. Thomas Richardson is in the office when I noticed that patient was dismissed back in 2020. I briefly put the patient on hold because I wasn't sure exactly what to tell patient. When discussing with co-worker the best approach to this - Dr. Sweta Lewis said he would speak with her. Dr. Kinjal Brown know that at this time we could not provide her care due to being dismissed back in 2020 for behavior issues. Sweta Lewis let patient know there are many other family medicine offices that she could try to be seen at.

## 2023-12-11 ENCOUNTER — TRANSCRIBE ORDERS (OUTPATIENT)
Dept: ADMINISTRATIVE | Age: 48
End: 2023-12-11

## 2023-12-11 DIAGNOSIS — Z12.31 VISIT FOR SCREENING MAMMOGRAM: Primary | ICD-10-CM

## 2023-12-12 ENCOUNTER — HOSPITAL ENCOUNTER (OUTPATIENT)
Dept: WOMENS IMAGING | Age: 48
Discharge: HOME | End: 2023-12-12
Payer: COMMERCIAL

## 2023-12-12 DIAGNOSIS — Z12.31 VISIT FOR SCREENING MAMMOGRAM: ICD-10-CM

## 2023-12-12 PROCEDURE — 77067 SCR MAMMO BI INCL CAD: CPT

## 2023-12-15 ENCOUNTER — HOSPITAL ENCOUNTER (OUTPATIENT)
Dept: WOMENS IMAGING | Age: 48
Discharge: HOME | End: 2023-12-15
Attending: RADIOLOGY
Payer: COMMERCIAL

## 2023-12-15 DIAGNOSIS — R92.8 ABNORMAL MAMMOGRAM: ICD-10-CM

## 2023-12-15 PROCEDURE — G0279 TOMOSYNTHESIS, MAMMO: HCPCS

## 2023-12-15 PROCEDURE — 76642 ULTRASOUND BREAST LIMITED: CPT

## 2024-03-18 ENCOUNTER — HOSPITAL ENCOUNTER (OUTPATIENT)
Dept: PHYSICAL THERAPY | Age: 49
Setting detail: THERAPIES SERIES
Discharge: HOME | End: 2024-03-18
Payer: COMMERCIAL

## 2024-03-18 PROCEDURE — 97162 PT EVAL MOD COMPLEX 30 MIN: CPT

## 2024-03-18 PROCEDURE — 97110 THERAPEUTIC EXERCISES: CPT

## 2024-03-18 NOTE — PROGRESS NOTES
and then progress to land.  I have emailed the physician to (1) request orders for rolling walker, and (2) asked physician opinion about if an MRI is recommended.      Body Structures/Functions/Activity Limitations: impaired activity tolerance, impaired balance, impaired ROM, impaired strength, abnormal gait, and abnormal posture  Prognosis: good    GOALS:  Patient Goal: pain relief and strengthening    Short Term Goals: 1 week  Pt to initiate a HEP: light open chain strengthening.  Pt to attain rolling walker (2WW) and demonstrate proper usage during transfers, gait, and navigation around obstacles.     Long Term Goals: 12 weeks  Patient to be independent with a HEP that may include stretching and strengthening.   Patient to attain 4+/5 strength in right knee to improve the functions of ADL and squatting.   Patient to attain 120 AROM in right knee flexion to improve gait and ADL.   Patient to walk modified independent with straight cane (as needed).        Patient Education:   [x]  HEP/Education Completed: Plan of Care, Goals, pain relief and strengthening  [facility] Access Code:  []  No new Education completed  []  Reviewed Prior HEP      [x]  Patient verbalized and/or demonstrated understanding of education provided.  []  Patient unable to verbalize and/or demonstrate understanding of education provided.  Will continue education.  []  Barriers to learning:     PLAN:  Treatment Recommendations: Strengthening, Range of Motion, Balance Training, Functional Mobility Training, Transfer Training, Endurance Training, Gait Training, Stair Training, Neuromuscular Re-education, Manual Therapy - Soft Tissue Mobilization, Pain Management, Home Exercise Program, Patient Education, Safety Education and Training, Positioning, Integrative Dry Needling, Aquatics, and Modalities    [x]  Plan of care initiated.  Plan to see patient 2 times per week for 12 weeks to address the treatment planned outlined above.  []  Continue with

## 2024-03-21 ENCOUNTER — HOSPITAL ENCOUNTER (OUTPATIENT)
Dept: PHYSICAL THERAPY | Age: 49
Setting detail: THERAPIES SERIES
Discharge: HOME | End: 2024-03-21
Payer: COMMERCIAL

## 2024-03-21 PROCEDURE — 97113 AQUATIC THERAPY/EXERCISES: CPT

## 2024-03-21 NOTE — PROGRESS NOTES
[facility]  PHYSICAL THERAPY  [] EVALUATION  [x] DAILY NOTE (LAND) [] DAILY NOTE (AQUATIC ) [] PROGRESS NOTE [] DISCHARGE NOTE  +  [x] OUTPATIENT REHABILITATION CENTER [facility]   [] [facility]    [] [facility]   [] [facility]    Date: 3/21/2024  Patient Name:  Coby Mcrae  : 1975  MRN: [id number]  CSN: [id number]    Referring Practitioner Pako Dyer MD    Diagnosis Unilateral primary osteoarthritis, right knee    Treatment Diagnosis M25.361 Other instability, right knee   Date of Evaluation 3/18/24    Additional Pertinent History HTN      Functional Outcome Measure Used LEFS   Functional Outcome Score 18 (3/18/24)       Insurance: Primary: Payor: [employer] /  /  / ,   Secondary:    Authorization Information: PRE CERTIFICATION REQUIRED: No precert required.  INSURANCE THERAPY BENEFIT: UNLIMITED VISITS BASED ON MEDICAL NECESSITY. .   AQUATIC THERAPY COVERED: Yes  MODALITIES COVERED:  Yes  TELEHEALTH COVERED:    REFERENCE NUMBER: [id number]    Approved Procedure Codes: Authorization of Specific CPT Codes Not Required  (Codes requested indicated by red font, codes approved indicated by black font)   Visit # 2, 2/10 for progress note (Reporting Period: 3/18/24 to 24)   Visits Allowed: Unlimited per medical necessity   Recertification Date: 2024   Physician Follow-Up: Per patient   Physician Orders:    History of Present Illness: Coby works for the [employer].  Works from home and sometimes goes to office.  Coby complains of right knee instability.  \"It is severely unstable, and I have no pain.\"  States she had bursitis for several months and tried a chiropractor but with no favorable results.  States chiropractor yanked on her knee and caused great pain.  Denies falls in the past year or having any metal hardware in her body.  States she had a Cortisone injection in her right knee in .  Denies

## 2024-03-26 ENCOUNTER — HOSPITAL ENCOUNTER (OUTPATIENT)
Dept: PHYSICAL THERAPY | Age: 49
Setting detail: THERAPIES SERIES
Discharge: HOME | End: 2024-03-26
Payer: COMMERCIAL

## 2024-03-26 PROCEDURE — 97113 AQUATIC THERAPY/EXERCISES: CPT

## 2024-03-26 NOTE — PROGRESS NOTES
[facility]  PHYSICAL THERAPY  [] EVALUATION  [x] DAILY NOTE (LAND) [] DAILY NOTE (AQUATIC ) [] PROGRESS NOTE [] DISCHARGE NOTE  +  [x] OUTPATIENT REHABILITATION CENTER [facility]   [] [place] [facility]    [] [facility]   [] [facility]    Date: 3/26/2024  Patient Name:  Coby Mcrae  : 1975  MRN: [id number]  CSN: [id number]    Referring Practitioner Pako Dyer MD    Diagnosis Unilateral primary osteoarthritis, right knee    Treatment Diagnosis M25.361 Other instability, right knee   Date of Evaluation 3/18/24    Additional Pertinent History HTN      Functional Outcome Measure Used LEFS   Functional Outcome Score 18 (3/18/24)       Insurance: Primary: Payor: [employer] /  /  / ,   Secondary:    Authorization Information: PRE CERTIFICATION REQUIRED: No precert required.  INSURANCE THERAPY BENEFIT: UNLIMITED VISITS BASED ON MEDICAL NECESSITY. .   AQUATIC THERAPY COVERED: Yes  MODALITIES COVERED:  Yes  TELEHEALTH COVERED:    REFERENCE NUMBER: [id number]    Approved Procedure Codes: Authorization of Specific CPT Codes Not Required  (Codes requested indicated by red font, codes approved indicated by black font)   Visit # 2, 2/10 for progress note (Reporting Period: 3/18/24 to 24)   Visits Allowed: Unlimited per medical necessity   Recertification Date: 2024   Physician Follow-Up: Per patient   Physician Orders:    History of Present Illness: Coby works for the [employer].  Works from home and sometimes goes to office.  Coby complains of right knee instability.  \"It is severely unstable, and I have no pain.\"  States she had bursitis for several months and tried a chiropractor but with no favorable results.  States chiropractor yanked on her knee and caused great pain.  Denies falls in the past year or having any metal hardware in her body.  States she had a Cortisone injection in her right knee in .  Denies

## 2024-03-28 ENCOUNTER — HOSPITAL ENCOUNTER (OUTPATIENT)
Dept: PHYSICAL THERAPY | Age: 49
Setting detail: THERAPIES SERIES
Discharge: HOME | End: 2024-03-28
Payer: COMMERCIAL

## 2024-03-28 PROCEDURE — 97113 AQUATIC THERAPY/EXERCISES: CPT

## 2024-03-28 NOTE — PROGRESS NOTES
[facility]  PHYSICAL THERAPY  [] EVALUATION  [x] DAILY NOTE (LAND) [] DAILY NOTE (AQUATIC ) [] PROGRESS NOTE [] DISCHARGE NOTE  +  [x] OUTPATIENT REHABILITATION CENTER [facility]   [] [place] [facility]    [] [facility]   [] [facility]    Date: 3/28/2024  Patient Name:  Coby Mcrae  : 1975  MRN: [id number]  CSN: [id number]    Referring Practitioner Pako Dyer MD    Diagnosis Unilateral primary osteoarthritis, right knee    Treatment Diagnosis M25.361 Other instability, right knee   Date of Evaluation 3/18/24    Additional Pertinent History HTN      Functional Outcome Measure Used LEFS   Functional Outcome Score 18 (3/18/24)       Insurance: Primary: Payor: [employer] /  /  / ,   Secondary:    Authorization Information: PRE CERTIFICATION REQUIRED: No precert required.  INSURANCE THERAPY BENEFIT: UNLIMITED VISITS BASED ON MEDICAL NECESSITY. .   AQUATIC THERAPY COVERED: Yes  MODALITIES COVERED:  Yes  TELEHEALTH COVERED:    REFERENCE NUMBER: [id number]    Approved Procedure Codes: Authorization of Specific CPT Codes Not Required  (Codes requested indicated by red font, codes approved indicated by black font)   Visit # 3, 3/10 for progress note (Reporting Period: 3/18/24 to 24)   Visits Allowed: Unlimited per medical necessity   Recertification Date: 2024   Physician Follow-Up: Per patient   Physician Orders:    History of Present Illness: Coby works for the [employer].  Works from home and sometimes goes to office.  Coby complains of right knee instability.  \"It is severely unstable, and I have no pain.\"  States she had bursitis for several months and tried a chiropractor but with no favorable results.  States chiropractor yanked on her knee and caused great pain.  Denies falls in the past year or having any metal hardware in her body.  States she had a Cortisone injection in her right knee in .  Denies

## 2024-04-03 ENCOUNTER — HOSPITAL ENCOUNTER (OUTPATIENT)
Dept: PHYSICAL THERAPY | Age: 49
Setting detail: THERAPIES SERIES
Discharge: HOME | End: 2024-04-03
Payer: COMMERCIAL

## 2024-04-03 PROCEDURE — 97113 AQUATIC THERAPY/EXERCISES: CPT

## 2024-04-03 NOTE — PROGRESS NOTES
[facility]  PHYSICAL THERAPY  [] EVALUATION  [x] DAILY NOTE (LAND) [] DAILY NOTE (AQUATIC ) [] PROGRESS NOTE [] DISCHARGE NOTE  +  [x] OUTPATIENT REHABILITATION CENTER [facility]   [] [place] [facility]    [] [facility]   [] [facility]    Date: 4/3/2024  Patient Name:  Coby Mcrae  : 1975  MRN: [id number]  CSN: [id number]    Referring Practitioner Pako Dyer MD    Diagnosis Unilateral primary osteoarthritis, right knee    Treatment Diagnosis M25.361 Other instability, right knee   Date of Evaluation 3/18/24    Additional Pertinent History HTN      Functional Outcome Measure Used LEFS   Functional Outcome Score 18 (3/18/24)       Insurance: Primary: Payor: [employer] /  /  / ,   Secondary:    Authorization Information: PRE CERTIFICATION REQUIRED: No precert required.  INSURANCE THERAPY BENEFIT: UNLIMITED VISITS BASED ON MEDICAL NECESSITY. .   AQUATIC THERAPY COVERED: Yes  MODALITIES COVERED:  Yes  TELEHEALTH COVERED:    REFERENCE NUMBER: [id number]    Approved Procedure Codes: Authorization of Specific CPT Codes Not Required  (Codes requested indicated by red font, codes approved indicated by black font)   Visit # 5, 5/10 for progress note (Reporting Period: 3/18/24 to 24)   Visits Allowed: Unlimited per medical necessity   Recertification Date: 2024   Physician Follow-Up: Per patient   Physician Orders:    History of Present Illness: Coby works for the [employer].  Works from home and sometimes goes to office.  Coby complains of right knee instability.  \"It is severely unstable, and I have no pain.\"  States she had bursitis for several months and tried a chiropractor but with no favorable results.  States chiropractor yanked on her knee and caused great pain.  Denies falls in the past year or having any metal hardware in her body.  States she had a Cortisone injection in her right knee in .  Denies

## 2024-04-05 ENCOUNTER — HOSPITAL ENCOUNTER (OUTPATIENT)
Dept: PHYSICAL THERAPY | Age: 49
Setting detail: THERAPIES SERIES
Discharge: HOME | End: 2024-04-05
Payer: COMMERCIAL

## 2024-04-05 PROCEDURE — 97113 AQUATIC THERAPY/EXERCISES: CPT

## 2024-04-05 NOTE — PROGRESS NOTES
ABD/ADD       Shoulder IR/ER       Shoulder Circles       Shoulder Shrugs       Rows       Bicep Curls              Upper Extremity Stretches:              Balance:              Dynamic Gait:              Deep Water:       Hang with pool noodle 5 min  x    Bicycle 2* min  x  L only, minimal R knee movement   Hip ABD/ADD 2* min  x    Hip Flex/Ext 2* min  x    Quad set * 10 x  5 sec x       Specific Interventions Next Treatment: pool therapy    Activity/Treatment Tolerance:  []  Patient tolerated treatment well  []  Patient limited by fatigue  []  Patient limited by pain   []  Patient limited by medical complications  [x]  Other: R knee mobility, fear    Assessment: Patient educated on safety with steps with descent leading with R LE performing step to gait pattern. Patient reports that she still does not feel the safest with that gait pattern on steps and prefers to go down on her buttock instead at home, able to complete non-reciprocal gait with steps to enter/exit the pool today. Continued aquatic exercise as noted above. Patient continues to have limited R knee ROM and is challenging to progress due to pain and mobility of R knee. Patient did request to use pool noodle in the deeper section for the last 20 minutes of the session to un-weight the knee. Progressed deep water tasks today. Patient reports no change in pain after the session but she had increased stiffness in R knee. Will progress as able per PT POC.         GOALS:  Patient Goal: pain relief and strengthening    Short Term Goals: 1 week  Pt to initiate a HEP: light open chain strengthening.  Pt to attain rolling walker (2WW) and demonstrate proper usage during transfers, gait, and navigation around obstacles.     Long Term Goals: 12 weeks  Patient to be independent with a HEP that may include stretching and strengthening.   Patient to attain 4+/5 strength in right knee to improve the functions of ADL and squatting.   Patient to attain 120 AROM in right

## 2024-04-10 ENCOUNTER — HOSPITAL ENCOUNTER (OUTPATIENT)
Dept: PHYSICAL THERAPY | Age: 49
Setting detail: THERAPIES SERIES
Discharge: HOME | End: 2024-04-10
Payer: COMMERCIAL

## 2024-04-10 PROCEDURE — 97113 AQUATIC THERAPY/EXERCISES: CPT

## 2024-04-10 NOTE — PROGRESS NOTES
[facility]  PHYSICAL THERAPY  [] EVALUATION  [] DAILY NOTE (LAND) [x] DAILY NOTE (AQUATIC ) [] PROGRESS NOTE [] DISCHARGE NOTE    [x] OUTPATIENT REHABILITATION CENTER [facility]   [] [place] [facility]    [] [facility]   [] [facility]    Date: 4/10/2024  Patient Name:  Coby Mcrae  : 1975  MRN: [id number]  CSN: [id number]    Referring Practitioner Pako Dyer MD    Diagnosis Unilateral primary osteoarthritis, right knee    Treatment Diagnosis M25.361 Other instability, right knee   Date of Evaluation 3/18/24    Additional Pertinent History HTN      Functional Outcome Measure Used LEFS   Functional Outcome Score 18 (3/18/24)       Insurance: Primary: Payor: [employer] /  /  / ,   Secondary:    Authorization Information: PRE CERTIFICATION REQUIRED: No precert required.  INSURANCE THERAPY BENEFIT: UNLIMITED VISITS BASED ON MEDICAL NECESSITY. .   AQUATIC THERAPY COVERED: Yes  MODALITIES COVERED:  Yes  TELEHEALTH COVERED:    REFERENCE NUMBER: [id number]    Approved Procedure Codes: Authorization of Specific CPT Codes Not Required  (Codes requested indicated by red font, codes approved indicated by black font)   Visit # 7, 7/10 for progress note (Reporting Period: 3/18/24 to 24)   Visits Allowed: Unlimited per medical necessity   Recertification Date: 2024   Physician Follow-Up: Per patient   Physician Orders:    History of Present Illness: Coby works for the [employer].  Works from home and sometimes goes to office.  Coby complains of right knee instability.  \"It is severely unstable, and I have no pain.\"  States she had bursitis for several months and tried a chiropractor but with no favorable results.  States chiropractor yanked on her knee and caused great pain.  Denies falls in the past year or having any metal hardware in her body.  States she had a Cortisone injection in her right knee in .  Denies

## 2024-04-15 ENCOUNTER — HOSPITAL ENCOUNTER (OUTPATIENT)
Dept: PHYSICAL THERAPY | Age: 49
Setting detail: THERAPIES SERIES
Discharge: HOME | End: 2024-04-15
Payer: COMMERCIAL

## 2024-04-15 PROCEDURE — 97113 AQUATIC THERAPY/EXERCISES: CPT

## 2024-04-15 NOTE — PROGRESS NOTES
[facility] Access Code:  []  No new Education completed  []  Reviewed Prior HEP      [x]  Patient verbalized and/or demonstrated understanding of education provided.  []  Patient unable to verbalize and/or demonstrate understanding of education provided.  Will continue education.  []  Barriers to learning:     PLAN:  Treatment Recommendations: Strengthening, Range of Motion, Balance Training, Functional Mobility Training, Transfer Training, Endurance Training, Gait Training, Stair Training, Neuromuscular Re-education, Manual Therapy - Soft Tissue Mobilization, Pain Management, Home Exercise Program, Patient Education, Safety Education and Training, Positioning, Integrative Dry Needling, Aquatics, and Modalities    []  Plan of care initiated.  Plan to see patient 2 times per week for 12 weeks to address the treatment planned outlined above.  [x]  Continue with current plan of care  []  Modify plan of care as follows:    []  Hold pending physician visit  []  Discharge    Time In 1433   Time Out 1515   Timed Code Minutes: 43 min   Total Treatment Time: 43 min     Electronically Signed by: Rayray Mcpherson PT

## 2024-04-18 ENCOUNTER — APPOINTMENT (OUTPATIENT)
Dept: PHYSICAL THERAPY | Age: 49
End: 2024-04-18
Payer: COMMERCIAL

## 2024-08-05 ENCOUNTER — TELEPHONE (OUTPATIENT)
Dept: PULMONOLOGY | Age: 49
End: 2024-08-05

## 2024-08-05 NOTE — TELEPHONE ENCOUNTER
LOV:9/25/23    NOV:10/3/24    Patient is suggesting that she may need pressure changes on her CPAP machine after  getting update x 1 month ago. Please follow up with patient to advise. Patient says she is resting fine and is unsure how much the pressures need changed but she wants Mey Mckee to advise on what should be done.

## 2024-08-06 NOTE — TELEPHONE ENCOUNTER
Her pressure Is still at 12 , this is what is was set on when I saw her in the office. Her current download only shows 3 days of use so it is not very helpful in seeing what is going on with her sleep apnea.  The days she has used it the pressure of 12 is controlling her sleep apnea well. Continue current settings and use more .

## 2024-08-07 NOTE — TELEPHONE ENCOUNTER
Lvm for patient letting her know that her pressure is set and is controlling her sleep apnea well, that patient needs to wear machine more often